# Patient Record
Sex: FEMALE | Race: WHITE | Employment: OTHER | URBAN - METROPOLITAN AREA
[De-identification: names, ages, dates, MRNs, and addresses within clinical notes are randomized per-mention and may not be internally consistent; named-entity substitution may affect disease eponyms.]

---

## 2023-04-25 ENCOUNTER — LAB REQUISITION (OUTPATIENT)
Dept: LAB | Facility: CLINIC | Age: 73
End: 2023-04-25
Payer: COMMERCIAL

## 2023-04-25 DIAGNOSIS — I50.9 HEART FAILURE, UNSPECIFIED (H): ICD-10-CM

## 2023-04-26 LAB
ANION GAP SERPL CALCULATED.3IONS-SCNC: 14 MMOL/L (ref 7–15)
BUN SERPL-MCNC: 25 MG/DL (ref 8–23)
CALCIUM SERPL-MCNC: 9.8 MG/DL (ref 8.8–10.2)
CHLORIDE SERPL-SCNC: 96 MMOL/L (ref 98–107)
CREAT SERPL-MCNC: 1.2 MG/DL (ref 0.51–0.95)
DEPRECATED HCO3 PLAS-SCNC: 28 MMOL/L (ref 22–29)
GFR SERPL CREATININE-BSD FRML MDRD: 47 ML/MIN/1.73M2
GLUCOSE SERPL-MCNC: 126 MG/DL (ref 70–99)
HGB BLD-MCNC: 12.5 G/DL (ref 11.7–15.7)
POTASSIUM SERPL-SCNC: 4.7 MMOL/L (ref 3.4–5.3)
SODIUM SERPL-SCNC: 138 MMOL/L (ref 136–145)

## 2023-04-26 PROCEDURE — 36415 COLL VENOUS BLD VENIPUNCTURE: CPT | Performed by: INTERNAL MEDICINE

## 2023-04-26 PROCEDURE — P9604 ONE-WAY ALLOW PRORATED TRIP: HCPCS | Performed by: INTERNAL MEDICINE

## 2023-04-26 PROCEDURE — 80048 BASIC METABOLIC PNL TOTAL CA: CPT | Performed by: INTERNAL MEDICINE

## 2023-04-26 PROCEDURE — 85018 HEMOGLOBIN: CPT | Performed by: INTERNAL MEDICINE

## 2023-05-01 ENCOUNTER — LAB REQUISITION (OUTPATIENT)
Dept: LAB | Facility: CLINIC | Age: 73
End: 2023-05-01
Payer: COMMERCIAL

## 2023-05-01 DIAGNOSIS — R60.1 GENERALIZED EDEMA: ICD-10-CM

## 2023-05-02 LAB
ANION GAP SERPL CALCULATED.3IONS-SCNC: 17 MMOL/L (ref 7–15)
BUN SERPL-MCNC: 27.3 MG/DL (ref 8–23)
CALCIUM SERPL-MCNC: 9.9 MG/DL (ref 8.8–10.2)
CHLORIDE SERPL-SCNC: 96 MMOL/L (ref 98–107)
CREAT SERPL-MCNC: 1.22 MG/DL (ref 0.51–0.95)
DEPRECATED HCO3 PLAS-SCNC: 25 MMOL/L (ref 22–29)
GFR SERPL CREATININE-BSD FRML MDRD: 46 ML/MIN/1.73M2
GLUCOSE SERPL-MCNC: 141 MG/DL (ref 70–99)
POTASSIUM SERPL-SCNC: 4.1 MMOL/L (ref 3.4–5.3)
SODIUM SERPL-SCNC: 138 MMOL/L (ref 136–145)

## 2023-05-02 PROCEDURE — P9604 ONE-WAY ALLOW PRORATED TRIP: HCPCS | Performed by: INTERNAL MEDICINE

## 2023-05-02 PROCEDURE — 36415 COLL VENOUS BLD VENIPUNCTURE: CPT | Performed by: INTERNAL MEDICINE

## 2023-05-02 PROCEDURE — 80048 BASIC METABOLIC PNL TOTAL CA: CPT | Performed by: INTERNAL MEDICINE

## 2023-07-10 ENCOUNTER — LAB REQUISITION (OUTPATIENT)
Dept: LAB | Facility: CLINIC | Age: 73
End: 2023-07-10
Payer: COMMERCIAL

## 2023-07-10 DIAGNOSIS — I48.20 CHRONIC ATRIAL FIBRILLATION, UNSPECIFIED (H): ICD-10-CM

## 2023-07-11 LAB
ANION GAP SERPL CALCULATED.3IONS-SCNC: 16 MMOL/L (ref 7–15)
BUN SERPL-MCNC: 23.4 MG/DL (ref 8–23)
CALCIUM SERPL-MCNC: 9.5 MG/DL (ref 8.8–10.2)
CHLORIDE SERPL-SCNC: 96 MMOL/L (ref 98–107)
CREAT SERPL-MCNC: 1.1 MG/DL (ref 0.51–0.95)
DEPRECATED HCO3 PLAS-SCNC: 25 MMOL/L (ref 22–29)
DIGOXIN SERPL-MCNC: 0.6 NG/ML (ref 0.6–2)
GFR SERPL CREATININE-BSD FRML MDRD: 52 ML/MIN/1.73M2
GLUCOSE SERPL-MCNC: 140 MG/DL (ref 70–99)
POTASSIUM SERPL-SCNC: 4.6 MMOL/L (ref 3.4–5.3)
SODIUM SERPL-SCNC: 137 MMOL/L (ref 136–145)

## 2023-07-11 PROCEDURE — P9604 ONE-WAY ALLOW PRORATED TRIP: HCPCS | Performed by: INTERNAL MEDICINE

## 2023-07-11 PROCEDURE — 80162 ASSAY OF DIGOXIN TOTAL: CPT | Performed by: INTERNAL MEDICINE

## 2023-07-11 PROCEDURE — 82310 ASSAY OF CALCIUM: CPT | Performed by: INTERNAL MEDICINE

## 2023-07-11 PROCEDURE — 36415 COLL VENOUS BLD VENIPUNCTURE: CPT | Performed by: INTERNAL MEDICINE

## 2023-09-18 ENCOUNTER — LAB REQUISITION (OUTPATIENT)
Dept: LAB | Facility: CLINIC | Age: 73
End: 2023-09-18
Payer: COMMERCIAL

## 2023-09-18 DIAGNOSIS — R30.0 DYSURIA: ICD-10-CM

## 2023-09-18 DIAGNOSIS — Z51.81 ENCOUNTER FOR THERAPEUTIC DRUG LEVEL MONITORING: ICD-10-CM

## 2023-09-19 LAB
ALBUMIN UR-MCNC: NEGATIVE MG/DL
APPEARANCE UR: ABNORMAL
BACTERIA #/AREA URNS HPF: ABNORMAL /HPF
BILIRUB UR QL STRIP: NEGATIVE
COLOR UR AUTO: YELLOW
GLUCOSE UR STRIP-MCNC: 150 MG/DL
HGB UR QL STRIP: ABNORMAL
HYALINE CASTS: 2 /LPF
KETONES UR STRIP-MCNC: NEGATIVE MG/DL
LEUKOCYTE ESTERASE UR QL STRIP: ABNORMAL
NITRATE UR QL: POSITIVE
PH UR STRIP: 6 [PH] (ref 5–7)
RBC URINE: 1 /HPF
SP GR UR STRIP: 1.01 (ref 1–1.03)
SQUAMOUS EPITHELIAL: 2 /HPF
TRANSITIONAL EPI: <1 /HPF
UROBILINOGEN UR STRIP-MCNC: NORMAL MG/DL
WBC CLUMPS #/AREA URNS HPF: PRESENT /HPF
WBC URINE: >182 /HPF

## 2023-09-19 PROCEDURE — 81001 URINALYSIS AUTO W/SCOPE: CPT | Performed by: NURSE PRACTITIONER

## 2023-09-19 PROCEDURE — 87086 URINE CULTURE/COLONY COUNT: CPT | Performed by: NURSE PRACTITIONER

## 2023-09-21 LAB
BACTERIA UR CULT: ABNORMAL
BACTERIA UR CULT: ABNORMAL

## 2023-09-24 ENCOUNTER — LAB REQUISITION (OUTPATIENT)
Dept: LAB | Facility: CLINIC | Age: 73
End: 2023-09-24
Payer: COMMERCIAL

## 2023-09-24 DIAGNOSIS — I50.9 HEART FAILURE, UNSPECIFIED (H): ICD-10-CM

## 2023-09-25 LAB
ANION GAP SERPL CALCULATED.3IONS-SCNC: 11 MMOL/L (ref 7–15)
BUN SERPL-MCNC: 43.9 MG/DL (ref 8–23)
CALCIUM SERPL-MCNC: 9.7 MG/DL (ref 8.8–10.2)
CHLORIDE SERPL-SCNC: 91 MMOL/L (ref 98–107)
CREAT SERPL-MCNC: 1.34 MG/DL (ref 0.51–0.95)
DEPRECATED HCO3 PLAS-SCNC: 30 MMOL/L (ref 22–29)
EGFRCR SERPLBLD CKD-EPI 2021: 41 ML/MIN/1.73M2
GLUCOSE SERPL-MCNC: 67 MG/DL (ref 70–99)
POTASSIUM SERPL-SCNC: 4.8 MMOL/L (ref 3.4–5.3)
SODIUM SERPL-SCNC: 132 MMOL/L (ref 136–145)

## 2023-09-25 PROCEDURE — 36415 COLL VENOUS BLD VENIPUNCTURE: CPT | Performed by: INTERNAL MEDICINE

## 2023-09-25 PROCEDURE — P9604 ONE-WAY ALLOW PRORATED TRIP: HCPCS | Performed by: INTERNAL MEDICINE

## 2023-09-25 PROCEDURE — 80048 BASIC METABOLIC PNL TOTAL CA: CPT | Performed by: INTERNAL MEDICINE

## 2023-10-10 ENCOUNTER — ASSISTED LIVING VISIT (OUTPATIENT)
Dept: GERIATRICS | Facility: CLINIC | Age: 73
End: 2023-10-10
Payer: COMMERCIAL

## 2023-10-10 VITALS
BODY MASS INDEX: 31.41 KG/M2 | DIASTOLIC BLOOD PRESSURE: 67 MMHG | RESPIRATION RATE: 16 BRPM | SYSTOLIC BLOOD PRESSURE: 115 MMHG | TEMPERATURE: 97.9 F | HEART RATE: 94 BPM | WEIGHT: 183 LBS

## 2023-10-10 DIAGNOSIS — E11.22 TYPE 2 DIABETES MELLITUS WITH STAGE 3A CHRONIC KIDNEY DISEASE, WITHOUT LONG-TERM CURRENT USE OF INSULIN (H): ICD-10-CM

## 2023-10-10 DIAGNOSIS — I50.20 HFREF (HEART FAILURE WITH REDUCED EJECTION FRACTION) (H): Primary | ICD-10-CM

## 2023-10-10 DIAGNOSIS — F01.B0 MODERATE VASCULAR DEMENTIA WITHOUT BEHAVIORAL DISTURBANCE, PSYCHOTIC DISTURBANCE, MOOD DISTURBANCE, OR ANXIETY (H): ICD-10-CM

## 2023-10-10 DIAGNOSIS — I48.20 CHRONIC ATRIAL FIBRILLATION (H): ICD-10-CM

## 2023-10-10 DIAGNOSIS — I89.0 LYMPHEDEMA: ICD-10-CM

## 2023-10-10 DIAGNOSIS — I25.10 CORONARY ARTERY DISEASE INVOLVING NATIVE CORONARY ARTERY OF NATIVE HEART WITHOUT ANGINA PECTORIS: ICD-10-CM

## 2023-10-10 DIAGNOSIS — Z71.89 ADVANCED DIRECTIVES, COUNSELING/DISCUSSION: ICD-10-CM

## 2023-10-10 DIAGNOSIS — I21.4 NSTEMI (NON-ST ELEVATED MYOCARDIAL INFARCTION) (H): ICD-10-CM

## 2023-10-10 DIAGNOSIS — I10 PRIMARY HYPERTENSION: ICD-10-CM

## 2023-10-10 DIAGNOSIS — I07.1 SEVERE TRICUSPID REGURGITATION: ICD-10-CM

## 2023-10-10 DIAGNOSIS — F41.8 DEPRESSION WITH ANXIETY: ICD-10-CM

## 2023-10-10 DIAGNOSIS — I73.9 PVD (PERIPHERAL VASCULAR DISEASE) (H): ICD-10-CM

## 2023-10-10 DIAGNOSIS — N18.31 TYPE 2 DIABETES MELLITUS WITH STAGE 3A CHRONIC KIDNEY DISEASE, WITHOUT LONG-TERM CURRENT USE OF INSULIN (H): ICD-10-CM

## 2023-10-10 DIAGNOSIS — M06.00 RHEUMATOID ARTHRITIS WITH NEGATIVE RHEUMATOID FACTOR, INVOLVING UNSPECIFIED SITE (H): ICD-10-CM

## 2023-10-10 PROCEDURE — 99344 HOME/RES VST NEW MOD MDM 60: CPT | Performed by: NURSE PRACTITIONER

## 2023-10-10 RX ORDER — CHOLECALCIFEROL (VITAMIN D3) 50 MCG
1 TABLET ORAL DAILY
COMMUNITY
End: 2023-10-11

## 2023-10-10 RX ORDER — METOPROLOL SUCCINATE 25 MG/1
25 TABLET, EXTENDED RELEASE ORAL DAILY
COMMUNITY
End: 2023-10-11

## 2023-10-10 RX ORDER — MULTIVITAMIN,THERAPEUTIC
1 TABLET ORAL DAILY
COMMUNITY
End: 2023-10-11

## 2023-10-10 RX ORDER — SPIRONOLACTONE 25 MG/1
12.5 TABLET ORAL DAILY
COMMUNITY
End: 2023-10-11

## 2023-10-10 RX ORDER — POTASSIUM CHLORIDE 1500 MG/1
20 TABLET, EXTENDED RELEASE ORAL 2 TIMES DAILY
COMMUNITY
End: 2023-10-11

## 2023-10-10 RX ORDER — DIGOXIN 125 MCG
125 TABLET ORAL DAILY
COMMUNITY
End: 2023-10-11

## 2023-10-10 RX ORDER — SERTRALINE HYDROCHLORIDE 25 MG/1
25 TABLET, FILM COATED ORAL DAILY
COMMUNITY
End: 2023-10-11

## 2023-10-10 RX ORDER — NYSTATIN 100000 U/G
OINTMENT TOPICAL PRN
COMMUNITY
End: 2023-10-10

## 2023-10-10 RX ORDER — LOSARTAN POTASSIUM 25 MG/1
12.5 TABLET ORAL DAILY
COMMUNITY
End: 2023-10-11

## 2023-10-10 RX ORDER — LANOLIN ALCOHOL/MO/W.PET/CERES
400 CREAM (GRAM) TOPICAL 2 TIMES DAILY
COMMUNITY
End: 2023-10-11

## 2023-10-10 RX ORDER — TORSEMIDE 20 MG/1
40 TABLET ORAL 2 TIMES DAILY
COMMUNITY
End: 2023-10-11

## 2023-10-10 RX ORDER — NITROGLYCERIN 0.4 MG/1
0.4 TABLET SUBLINGUAL EVERY 5 MIN PRN
COMMUNITY
End: 2024-09-04

## 2023-10-10 RX ORDER — ROSUVASTATIN CALCIUM 10 MG/1
10 TABLET, COATED ORAL DAILY
COMMUNITY
End: 2023-10-11

## 2023-10-10 NOTE — PROGRESS NOTES
"[facility] GERIATRICS    PRIMARY CARE PROVIDER AND CLINIC:  SHALONAD Kincaid CNP, [address] [facility] [address]  Chief Complaint   Patient presents with    [facility] Medical Record Number:  [id number]  Place of Service where encounter took place:  THE [facility]) [[id number]]    Rachel Weiss  is a 73 year old  (1950), admitted to Memory Care at the above facility 9/26/2023 for a higher level of care following hospitalization and tcu stay.     HPI:    Medical history significant for vascular dementia, HFrEF, afib, CAD with STEMI and stent to LAD 11/2021, ischemic cardiomyopathy, PVD, lymphedema, diabetes type 2, CKD stage 3, HTN, asthma, rheumatoid arthritis, depression, anxiety.   She was hospitalized at [facility] 9/6-9/13/2023 with acute on chronic CHF. She was found to be in afib with RVR. Cardiology consulted. She was diuresed with lasix IV and discharged on higher dose of torsemide (increased from 60 mg daily to 60 mg in am and 40 mg in pm). Empagliflozin was started. Digoxin dose was increased and metoprolol continued for afib. She was diuresed for 14 lbs with weight 181 lbs at discharge. Estimated dry weight ~182 lbs. She discharged to [facility] [place], operated by [facility] where she made fair progress in therapies. Torsemide was decreased to 40 mg bid and losartan decreased to 12.5 mg daily for hypotension and weight 177 lbs. She received cipro for UTI.     She is a poor historian. Pleasantly confused. Denies feeling ill. Good appetite. Denies pain. Reports her legs are \"about the same.\" Wheelchair bound and requires assist of 1 with transfers and cares. Staff reports no behavior issues. She had a wound of her RLE that has healed. Followed by [facility].   Spoke with her  Madhu. He's been caring for her at home for 2 yrs. This was her 3rd hospitalization this year and her care needs have increased. He reports her LE edema has improved and she seems to " be adjusting well to the unit.   [facility]     CODE STATUS/ADVANCE DIRECTIVES DISCUSSION:  No CPR- Do NOT Intubate  DNR/DNI  ALLERGIES:   Allergies   Allergen Reactions    Shellfish-Derived Products Anaphylaxis     Shellfish.     Amoxicillin     Bactrim [Sulfamethoxazole-Trimethoprim]     Ceftin [Cefuroxime]     Celebrex [Celecoxib]     Doxycycline     Levaquin [Levofloxacin]     Lodine [Etodolac]     Pcn [Penicillins] Hives    Sulfa Antibiotics     Valtrex [Valacyclovir]     Zithromax [Azithromycin]       PAST MEDICAL HISTORY:   Past Medical History:   Diagnosis Date    A-fib (H)     Arthritis     CAD (coronary artery disease)     CKD (chronic kidney disease) stage 3, GFR 30-59 ml/min (H)     Depression with anxiety     Diabetes mellitus, type 2 (H)     Diverticulitis     perforation 10/2013    HFrEF (heart failure with reduced ejection fraction) (H)     HTN (hypertension)     Ischemic cardiomyopathy     Lymphedema     NSTEMI (non-ST elevated myocardial infarction) (H) 11/2021    stent to LAD    PVD (peripheral vascular disease) (H24)     RA (rheumatoid arthritis) (H)     Severe tricuspid regurgitation     Shingles 2006    Umbilical hernia without obstruction and without gangrene     Uncomplicated asthma     Vascular dementia (H)       PAST SURGICAL HISTORY:   has a past surgical history that includes tonsillectomy; Cataract Extraction (Bilateral); Stent; and Cholecystectomy (04/2023).  FAMILY HISTORY: family history includes Cancer in her brother; Hypertension in her brother and mother; Throat cancer in her father.  SOCIAL HISTORY:   reports that she has never smoked. She has never used smokeless tobacco. She reports that she does not currently use alcohol. She reports that she does not use drugs.  Patient's living condition: lives in an assisted living facility  Previously lived at home with her , Madhu. No children. 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    Hinduism: no blood products     Post Discharge Medication Reconciliation Status:   MED REC REQUIRED  Post Medication Reconciliation Status:  Discharge medications reconciled, continue medications without change       Current Outpatient Medications   Medication Sig    apixaban ANTICOAGULANT (ELIQUIS) 5 MG tablet Take 5 mg by mouth 2 times daily    digoxin (LANOXIN) 125 MCG tablet Take 1 tablet (125 mcg) by mouth daily    dimethicone-zinc oxide (ANSLEY PROTECT) external cream Apply topically daily    dulaglutide (TRULICITY) 0.75 MG/0.5ML pen Inject 0.75 mg Subcutaneous every 7 days    empagliflozin (JARDIANCE) 10 MG TABS tablet Take 1 tablet (10 mg) by mouth daily    folic acid (FOLVITE) 400 MCG tablet Take 1 tablet (400 mcg) by mouth 2 times daily    losartan (COZAAR) 25 MG tablet Take 0.5 tablets (12.5 mg) by mouth daily    metFORMIN (GLUCOPHAGE) 500 MG tablet Take 2 tablets (1,000 mg) by mouth 2 times daily (with meals)    metoprolol succinate ER (TOPROL XL) 25 MG 24 hr tablet Take 1 tablet (25 mg) by mouth daily    multivitamin, therapeutic (THERA-VIT) TABS tablet Take 1 tablet by mouth daily    nitroGLYcerin (NITROSTAT) 0.4 MG sublingual tablet Place 0.4 mg under the tongue every 5 minutes as needed for chest pain For chest pain place 1 tablet under the tongue every 5 minutes for 3 doses. If symptoms persist 5 minutes after 1st dose call 911.    potassium chloride ER (K-TAB) 20 MEQ CR tablet Take 1 tablet (20 mEq) by mouth 2 times daily    rosuvastatin (CRESTOR) 10 MG tablet Take 1 tablet (10 mg) by mouth daily    sertraline (ZOLOFT) 25 MG tablet Take 1 tablet (25 mg) by mouth daily    spironolactone (ALDACTONE) 25 MG tablet Take 0.5 tablets (12.5 mg) by mouth daily    torsemide (DEMADEX) 20 MG tablet Take 2 tablets (40 mg) by mouth 2 times daily    vitamin D3 (CHOLECALCIFEROL) 50 mcg (2000 units) tablet Take 1 tablet (50 mcg) by mouth daily     No current facility-administered medications for this  visit.     ROS:  Limited due to dementia. Positives as noted under HPI     Vitals:  /67   Pulse 94   Temp 97.9  F (36.6  C)   Resp 16   Wt 83 kg (183 lb)   BMI 31.41 kg/m    Exam:  GENERAL APPEARANCE:  Alert, in no distress  ENT:  [place], oropharynx clear  EYES:  conjunctiva and lids normal  NECK:  no adenopathy, no thyromegaly  RESP:  lungs clear to auscultation   CV:  regular rate and rhythm, 1/6 murmur, peripheral edema 2+ in both LE  ABDOMEN:  soft, non-tender, no distension, no masses  M/S:   wheelchair. IDCKERSON with good strength. No joint inflammation  SKIN:  no visible rashes or open areas  PSYCH:  oriented to self, situation, insight and judgement impaired, memory impaired , affect and mood normal    Lab/Diagnostic data:  Recent labs in [facility] reviewed by me today.     ECHO 6/28/2023:  Final Impressions:   Limited Echocardiogram performed    1. Technically limited exam.    2. Normal LV size, normal wall thickness, estimated EF of ~30%.    3. Mid and distal segments are severely hypo- or akinetic.    4. Severe RV enlargement, mild or moderately reduced systolic function.    5. Severe biatrial enlargement.    6. The aortic valve is sclerotic, mild stenosis and no regurgitation.    7. Severe MAC, sclerotic mitral leaflets, trace mitral regurgitation.    8. Moderate-severe tricuspid regurgitation.    9. Pulmonary hypertension: PASP 63 mmHg + RA pressure.   10. The inferior vena cava is dilated, respiratory size variation less than 50%.       ASSESSMENT / PLAN:  (I50.20) HFrEF (heart failure with reduced ejection fraction) (H)  (primary encounter diagnosis)  Comment: ECHO above-EF 30%, severe TR.   Appears euvolemic with weight 185 lbs   Plan: continue torsemide 40 mg bid, digoxin, metoprolol, losartan. Follow weight, symptoms. CBC, BMP. Follow up with [facility] per usual schedule.   agrees with plan of care  Discussed with staff     (I48.20) Chronic atrial fibrillation (H)  Comment: rate  controlled, rhythm regular on exam   History of left atrial appendage thrombus, resolved   Plan: continue apixaban 5 mg bid,  digoxin 125 mcg daily, metoprolol ER 25 mg daily    (I25.10) Coronary artery disease involving native coronary artery of native heart without angina pectoris  Comment: history of NSTEMI, stent to LAD 11/2021. Last angio 11/2022 showed non-obstructive disease, patent stent. No chest pain or acute issues   Plan: continue     (F01.B0) Moderate vascular dementia without behavioral disturbance, psychotic disturbance, mood disturbance, or anxiety (H)  Comment: moderate deficits with poor short term memory and low functional status. Appears to be adjusting well to the unit   Plan: Memory Care staff assistance with cares, meals, activities, med admin    (E11.22,  N18.31) Type 2 diabetes mellitus with stage 3a chronic kidney disease, without long-term current use of insulin (H)  Comment: empagliflozin started while inpatient for CHF. Last HgbA1c found on chart review was 84 on 3/8/2021.   Blood glucose: 157-197, outlier 76  Plan: HgbA1c. Continue empagliflozin 10 mg daily, metformin 1000 mg bid, Trulicity 0.75 mg weekly. Monitor blood glucose     (I73.9) PVD (peripheral vascular disease) (H24)  (I89.0) Lymphedema  Comment: chronic, improved  Plan: continue torsemide. Closely monitor skin. Continue PHYSICAL THERAPY/OT/lymphedema management with [facility]     (I10) Primary hypertension  Comment: recent BP soft: 115/67  Losartan and torsemide decreased while on tcu due to hypotension   Plan: continue losartan 12.5 mg daily, torsemide 40 mg bid, metoprolol ER 25 mg daily, spironolactone 12.5 mg daily. Monitor VS and adjust meds as indicated.     (F41.8) Depression with anxiety  Comment: appears well managed   Plan: continue sertraline 25 mg daily    (M06.00) Rheumatoid arthritis with negative rheumatoid factor, involving unspecified site (H)  Comment: per history, no acute pain issues. Has been on  low dose prednisone and methotrexate in the past. Last saw Dr Singh 5/2021.   Plan: monitor symptoms     (Z71.89) Advanced directives, counseling/discussion  Comment:  confirms DNR/DNI  Plan: POLST completed and orders updated         Electronically signed by:  SHALONDA Kincaid CNP

## 2023-10-10 NOTE — LETTER
"    10/10/2023        RE: Rachel Weiss  [address] [facility] [address]        [facility] GERIATRICS    PRIMARY CARE PROVIDER AND CLINIC:  SHALONDA Kincaid CNP, [address] [facility] [address]  Chief Complaint   Patient presents with     [facility] Medical Record Number:  [id number]  Place of Service where encounter took place:  THE [facility]) [[id number]]    Rachel Weiss  is a 73 year old  (1950), admitted to Memory Care at the above facility 9/26/2023 for a higher level of care following hospitalization and tcu stay.     HPI:    Medical history significant for vascular dementia, HFrEF, afib, CAD with STEMI and stent to LAD 11/2021, ischemic cardiomyopathy, PVD, lymphedema, diabetes type 2, CKD stage 3, HTN, asthma, rheumatoid arthritis, depression, anxiety.   She was hospitalized at [facility] 9/6-9/13/2023 with acute on chronic CHF. She was found to be in afib with RVR. Cardiology consulted. She was diuresed with lasix IV and discharged on higher dose of torsemide (increased from 60 mg daily to 60 mg in am and 40 mg in pm). Empagliflozin was started. Digoxin dose was increased and metoprolol continued for afib. She was diuresed for 14 lbs with weight 181 lbs at discharge. Estimated dry weight ~182 lbs. She discharged to [facility] where she made fair progress in therapies. Torsemide was decreased to 40 mg bid and losartan decreased to 12.5 mg daily for hypotension and weight 177 lbs. She received cipro for UTI.     She is a poor historian. Pleasantly confused. Denies feeling ill. Good appetite. Denies pain. Reports her legs are \"about the same.\" Wheelchair bound and requires assist of 1 with transfers and cares. Staff reports no behavior issues. She had a wound of her RLE that has healed. Followed by [facility].   Spoke with her  Madhu. He's been caring for her at home for 2 yrs. This was her 3rd hospitalization this year " and her care needs have increased. He reports her LE edema has improved and she seems to be adjusting well to the unit.   [facility] 17/30 in 2021.     CODE STATUS/ADVANCE DIRECTIVES DISCUSSION:  No CPR- Do NOT Intubate  DNR/DNI  ALLERGIES:   Allergies   Allergen Reactions     Shellfish-Derived Products Anaphylaxis     Shellfish.      Amoxicillin      Bactrim [Sulfamethoxazole-Trimethoprim]      Ceftin [Cefuroxime]      Celebrex [Celecoxib]      Doxycycline      Levaquin [Levofloxacin]      Lodine [Etodolac]      Pcn [Penicillins] Hives     Sulfa Antibiotics      Valtrex [Valacyclovir]      Zithromax [Azithromycin]       PAST MEDICAL HISTORY:   Past Medical History:   Diagnosis Date     A-fib (H)      Arthritis      CAD (coronary artery disease)      CKD (chronic kidney disease) stage 3, GFR 30-59 ml/min (H)      Depression with anxiety      Diabetes mellitus, type 2 (H)      Diverticulitis     perforation 10/2013     HFrEF (heart failure with reduced ejection fraction) (H)      HTN (hypertension)      Ischemic cardiomyopathy      Lymphedema      NSTEMI (non-ST elevated myocardial infarction) (H) 11/2021    stent to LAD     PVD (peripheral vascular disease) (H24)      RA (rheumatoid arthritis) (H)      Severe tricuspid regurgitation      Shingles 2006     Umbilical hernia without obstruction and without gangrene      Uncomplicated asthma      Vascular dementia (H)       PAST SURGICAL HISTORY:   has a past surgical history that includes tonsillectomy; Cataract Extraction (Bilateral); Stent; and Cholecystectomy (04/2023).  FAMILY HISTORY: family history includes Cancer in her brother; Hypertension in her brother and mother; Throat cancer in her father.  SOCIAL HISTORY:   reports that she has never smoked. She has never used smokeless tobacco. She reports that she does not currently use alcohol. She reports that she does not use drugs.  Patient's living condition: lives in an assisted living facility  Previously lived at  home with her , Madhu. No children. 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   Pentecostal: no blood products     Post Discharge Medication Reconciliation Status:   MED REC REQUIRED  Post Medication Reconciliation Status:  Discharge medications reconciled, continue medications without change       Current Outpatient Medications   Medication Sig     apixaban ANTICOAGULANT (ELIQUIS) 5 MG tablet Take 5 mg by mouth 2 times daily     digoxin (LANOXIN) 125 MCG tablet Take 1 tablet (125 mcg) by mouth daily     dimethicone-zinc oxide (ANSLEY PROTECT) external cream Apply topically daily     dulaglutide (TRULICITY) 0.75 MG/0.5ML pen Inject 0.75 mg Subcutaneous every 7 days     empagliflozin (JARDIANCE) 10 MG TABS tablet Take 1 tablet (10 mg) by mouth daily     folic acid (FOLVITE) 400 MCG tablet Take 1 tablet (400 mcg) by mouth 2 times daily     losartan (COZAAR) 25 MG tablet Take 0.5 tablets (12.5 mg) by mouth daily     metFORMIN (GLUCOPHAGE) 500 MG tablet Take 2 tablets (1,000 mg) by mouth 2 times daily (with meals)     metoprolol succinate ER (TOPROL XL) 25 MG 24 hr tablet Take 1 tablet (25 mg) by mouth daily     multivitamin, therapeutic (THERA-VIT) TABS tablet Take 1 tablet by mouth daily     nitroGLYcerin (NITROSTAT) 0.4 MG sublingual tablet Place 0.4 mg under the tongue every 5 minutes as needed for chest pain For chest pain place 1 tablet under the tongue every 5 minutes for 3 doses. If symptoms persist 5 minutes after 1st dose call 911.     potassium chloride ER (K-TAB) 20 MEQ CR tablet Take 1 tablet (20 mEq) by mouth 2 times daily     rosuvastatin (CRESTOR) 10 MG tablet Take 1 tablet (10 mg) by mouth daily     sertraline (ZOLOFT) 25 MG tablet Take 1 tablet (25 mg) by mouth daily     spironolactone (ALDACTONE) 25 MG tablet Take 0.5 tablets (12.5 mg) by mouth daily     torsemide (DEMADEX) 20 MG tablet Take 2 tablets (40 mg) by mouth 2 times daily     vitamin D3 (CHOLECALCIFEROL)  50 mcg (2000 units) tablet Take 1 tablet (50 mcg) by mouth daily     No current facility-administered medications for this visit.     ROS:  Limited due to dementia. Positives as noted under HPI     Vitals:  /67   Pulse 94   Temp 97.9  F (36.6  C)   Resp 16   Wt 83 kg (183 lb)   BMI 31.41 kg/m    Exam:  GENERAL APPEARANCE:  Alert, in no distress  ENT:  [place], oropharynx clear  EYES:  conjunctiva and lids normal  NECK:  no adenopathy, no thyromegaly  RESP:  lungs clear to auscultation   CV:  regular rate and rhythm, 1/6 murmur, peripheral edema 2+ in both LE  ABDOMEN:  soft, non-tender, no distension, no masses  M/S:   wheelchair. DICKERSON with good strength. No joint inflammation  SKIN:  no visible rashes or open areas  PSYCH:  oriented to self, situation, insight and judgement impaired, memory impaired , affect and mood normal    Lab/Diagnostic data:  Recent labs in [facility] reviewed by me today.     ECHO 6/28/2023:  Final Impressions:   Limited Echocardiogram performed    1. Technically limited exam.    2. Normal LV size, normal wall thickness, estimated EF of ~30%.    3. Mid and distal segments are severely hypo- or akinetic.    4. Severe RV enlargement, mild or moderately reduced systolic function.    5. Severe biatrial enlargement.    6. The aortic valve is sclerotic, mild stenosis and no regurgitation.    7. Severe MAC, sclerotic mitral leaflets, trace mitral regurgitation.    8. Moderate-severe tricuspid regurgitation.    9. Pulmonary hypertension: PASP 63 mmHg + RA pressure.   10. The inferior vena cava is dilated, respiratory size variation less than 50%.       ASSESSMENT / PLAN:  (I50.20) HFrEF (heart failure with reduced ejection fraction) (H)  (primary encounter diagnosis)  Comment: ECHO above-EF 30%, severe TR.   Appears euvolemic with weight 185 lbs   Plan: continue torsemide 40 mg bid, digoxin, metoprolol, losartan. Follow weight, symptoms. CBC, BMP. Follow up with [facility] per usual  schedule.   agrees with plan of care  Discussed with staff     (I48.20) Chronic atrial fibrillation (H)  Comment: rate controlled, rhythm regular on exam   History of left atrial appendage thrombus, resolved   Plan: continue apixaban 5 mg bid,  digoxin 125 mcg daily, metoprolol ER 25 mg daily    (I25.10) Coronary artery disease involving native coronary artery of native heart without angina pectoris  Comment: history of NSTEMI, stent to LAD 11/2021. Last angio 11/2022 showed non-obstructive disease, patent stent. No chest pain or acute issues   Plan: continue     (F01.B0) Moderate vascular dementia without behavioral disturbance, psychotic disturbance, mood disturbance, or anxiety (H)  Comment: moderate deficits with poor short term memory and low functional status. Appears to be adjusting well to the unit   Plan: Memory Care staff assistance with cares, meals, activities, med admin    (E11.22,  N18.31) Type 2 diabetes mellitus with stage 3a chronic kidney disease, without long-term current use of insulin (H)  Comment: empagliflozin started while inpatient for CHF. Last HgbA1c found on chart review was 84 on 3/8/2021.   Blood glucose: 157-197, outlier 76  Plan: HgbA1c. Continue empagliflozin 10 mg daily, metformin 1000 mg bid, Trulicity 0.75 mg weekly. Monitor blood glucose     (I73.9) PVD (peripheral vascular disease) (H24)  (I89.0) Lymphedema  Comment: chronic, improved  Plan: continue torsemide. Closely monitor skin. Continue PHYSICAL THERAPY/OT/lymphedema management with [facility]     (I10) Primary hypertension  Comment: recent BP soft: 115/67  Losartan and torsemide decreased while on tcu due to hypotension   Plan: continue losartan 12.5 mg daily, torsemide 40 mg bid, metoprolol ER 25 mg daily, spironolactone 12.5 mg daily. Monitor VS and adjust meds as indicated.     (F41.8) Depression with anxiety  Comment: appears well managed   Plan: continue sertraline 25 mg daily    (M06.00) Rheumatoid  arthritis with negative rheumatoid factor, involving unspecified site (H)  Comment: per history, no acute pain issues. Has been on low dose prednisone and methotrexate in the past. Last saw Dr Singh 5/2021.   Plan: monitor symptoms     (Z71.89) Advanced directives, counseling/discussion  Comment:  confirms DNR/DNI  Plan: POLST completed and orders updated         Electronically signed by:  SHALONDA Kincaid CNP                     Sincerely,        SHALONDA Kincaid CNP

## 2023-10-11 DIAGNOSIS — I50.9 CONGESTIVE HEART FAILURE, UNSPECIFIED HF CHRONICITY, UNSPECIFIED HEART FAILURE TYPE (H): Primary | ICD-10-CM

## 2023-10-11 RX ORDER — CHOLECALCIFEROL (VITAMIN D3) 50 MCG
1 TABLET ORAL DAILY
Qty: 90 TABLET | Refills: 3 | Status: SHIPPED | OUTPATIENT
Start: 2023-10-11 | End: 2024-09-03

## 2023-10-11 RX ORDER — DIGOXIN 125 MCG
125 TABLET ORAL DAILY
Qty: 90 TABLET | Refills: 3 | Status: SHIPPED | OUTPATIENT
Start: 2023-10-11 | End: 2024-09-03

## 2023-10-11 RX ORDER — POTASSIUM CHLORIDE 1500 MG/1
20 TABLET, EXTENDED RELEASE ORAL 2 TIMES DAILY
Qty: 180 TABLET | Refills: 3 | Status: SHIPPED | OUTPATIENT
Start: 2023-10-11 | End: 2024-02-23 | Stop reason: DRUGHIGH

## 2023-10-11 RX ORDER — SPIRONOLACTONE 25 MG/1
12.5 TABLET ORAL DAILY
Qty: 45 TABLET | Refills: 3 | Status: SHIPPED | OUTPATIENT
Start: 2023-10-11 | End: 2024-09-03

## 2023-10-11 RX ORDER — LOSARTAN POTASSIUM 25 MG/1
12.5 TABLET ORAL DAILY
Qty: 45 TABLET | Refills: 3 | Status: SHIPPED | OUTPATIENT
Start: 2023-10-11 | End: 2023-12-15

## 2023-10-11 RX ORDER — METOPROLOL SUCCINATE 25 MG/1
25 TABLET, EXTENDED RELEASE ORAL DAILY
Qty: 90 TABLET | Refills: 3 | Status: SHIPPED | OUTPATIENT
Start: 2023-10-11 | End: 2024-09-03

## 2023-10-11 RX ORDER — SERTRALINE HYDROCHLORIDE 25 MG/1
25 TABLET, FILM COATED ORAL DAILY
Qty: 90 TABLET | Refills: 3 | Status: SHIPPED | OUTPATIENT
Start: 2023-10-11 | End: 2023-11-21 | Stop reason: DRUGHIGH

## 2023-10-11 RX ORDER — TORSEMIDE 20 MG/1
40 TABLET ORAL 2 TIMES DAILY
Qty: 360 TABLET | Refills: 3 | Status: SHIPPED | OUTPATIENT
Start: 2023-10-11 | End: 2024-02-28

## 2023-10-11 RX ORDER — MULTIVITAMIN,THERAPEUTIC
1 TABLET ORAL DAILY
Qty: 90 TABLET | Refills: 3 | Status: SHIPPED | OUTPATIENT
Start: 2023-10-11 | End: 2024-05-14

## 2023-10-11 RX ORDER — ROSUVASTATIN CALCIUM 10 MG/1
10 TABLET, COATED ORAL DAILY
Qty: 90 TABLET | Refills: 3 | Status: SHIPPED | OUTPATIENT
Start: 2023-10-11 | End: 2024-09-03

## 2023-10-11 RX ORDER — LANOLIN ALCOHOL/MO/W.PET/CERES
400 CREAM (GRAM) TOPICAL 2 TIMES DAILY
Qty: 180 TABLET | Refills: 3 | Status: SHIPPED | OUTPATIENT
Start: 2023-10-11 | End: 2024-05-14

## 2023-10-13 ENCOUNTER — TELEPHONE (OUTPATIENT)
Dept: GERIATRICS | Facility: CLINIC | Age: 73
End: 2023-10-13
Payer: COMMERCIAL

## 2023-10-18 ENCOUNTER — LAB REQUISITION (OUTPATIENT)
Dept: LAB | Facility: CLINIC | Age: 73
End: 2023-10-18
Payer: COMMERCIAL

## 2023-10-18 DIAGNOSIS — E11.69 TYPE 2 DIABETES MELLITUS WITH OTHER SPECIFIED COMPLICATION (H): ICD-10-CM

## 2023-10-18 DIAGNOSIS — I50.40 UNSPECIFIED COMBINED SYSTOLIC (CONGESTIVE) AND DIASTOLIC (CONGESTIVE) HEART FAILURE (H): ICD-10-CM

## 2023-10-19 ENCOUNTER — ASSISTED LIVING VISIT (OUTPATIENT)
Dept: GERIATRICS | Facility: CLINIC | Age: 73
End: 2023-10-19
Payer: COMMERCIAL

## 2023-10-19 VITALS
TEMPERATURE: 98.1 F | HEART RATE: 61 BPM | BODY MASS INDEX: 31.82 KG/M2 | RESPIRATION RATE: 14 BRPM | SYSTOLIC BLOOD PRESSURE: 110 MMHG | WEIGHT: 185.4 LBS | DIASTOLIC BLOOD PRESSURE: 53 MMHG

## 2023-10-19 DIAGNOSIS — I89.0 LYMPHEDEMA: ICD-10-CM

## 2023-10-19 DIAGNOSIS — I50.20 HFREF (HEART FAILURE WITH REDUCED EJECTION FRACTION) (H): ICD-10-CM

## 2023-10-19 DIAGNOSIS — F01.B0 MODERATE VASCULAR DEMENTIA WITHOUT BEHAVIORAL DISTURBANCE, PSYCHOTIC DISTURBANCE, MOOD DISTURBANCE, OR ANXIETY (H): ICD-10-CM

## 2023-10-19 DIAGNOSIS — L03.115 CELLULITIS OF RIGHT LOWER EXTREMITY: Primary | ICD-10-CM

## 2023-10-19 LAB
ANION GAP SERPL CALCULATED.3IONS-SCNC: 15 MMOL/L (ref 7–15)
BUN SERPL-MCNC: 31.8 MG/DL (ref 8–23)
CALCIUM SERPL-MCNC: 9.7 MG/DL (ref 8.8–10.2)
CHLORIDE SERPL-SCNC: 92 MMOL/L (ref 98–107)
CREAT SERPL-MCNC: 1.15 MG/DL (ref 0.51–0.95)
DEPRECATED HCO3 PLAS-SCNC: 28 MMOL/L (ref 22–29)
EGFRCR SERPLBLD CKD-EPI 2021: 49 ML/MIN/1.73M2
ERYTHROCYTE [DISTWIDTH] IN BLOOD BY AUTOMATED COUNT: 19.4 % (ref 10–15)
GLUCOSE SERPL-MCNC: 101 MG/DL (ref 70–99)
HBA1C MFR BLD: 6.7 %
HCT VFR BLD AUTO: 37.8 % (ref 35–47)
HGB BLD-MCNC: 12 G/DL (ref 11.7–15.7)
MCH RBC QN AUTO: 28.2 PG (ref 26.5–33)
MCHC RBC AUTO-ENTMCNC: 31.7 G/DL (ref 31.5–36.5)
MCV RBC AUTO: 89 FL (ref 78–100)
PLATELET # BLD AUTO: 217 10E3/UL (ref 150–450)
POTASSIUM SERPL-SCNC: 4.1 MMOL/L (ref 3.4–5.3)
RBC # BLD AUTO: 4.26 10E6/UL (ref 3.8–5.2)
SODIUM SERPL-SCNC: 135 MMOL/L (ref 135–145)
WBC # BLD AUTO: 7.3 10E3/UL (ref 4–11)

## 2023-10-19 PROCEDURE — 99349 HOME/RES VST EST MOD MDM 40: CPT | Performed by: NURSE PRACTITIONER

## 2023-10-19 PROCEDURE — 85027 COMPLETE CBC AUTOMATED: CPT | Mod: ORL | Performed by: NURSE PRACTITIONER

## 2023-10-19 PROCEDURE — P9603 ONE-WAY ALLOW PRORATED MILES: HCPCS | Mod: ORL | Performed by: NURSE PRACTITIONER

## 2023-10-19 PROCEDURE — 36415 COLL VENOUS BLD VENIPUNCTURE: CPT | Mod: ORL | Performed by: NURSE PRACTITIONER

## 2023-10-19 PROCEDURE — 83036 HEMOGLOBIN GLYCOSYLATED A1C: CPT | Mod: ORL | Performed by: NURSE PRACTITIONER

## 2023-10-19 PROCEDURE — 80048 BASIC METABOLIC PNL TOTAL CA: CPT | Mod: ORL | Performed by: NURSE PRACTITIONER

## 2023-10-19 RX ORDER — CIPROFLOXACIN 500 MG/1
500 TABLET, FILM COATED ORAL 2 TIMES DAILY
Qty: 14 TABLET | Refills: 0 | Status: SHIPPED | OUTPATIENT
Start: 2023-10-19 | End: 2023-10-26

## 2023-10-19 NOTE — LETTER
10/19/2023        RE: Rachel Weiss  [facility]   [address] [facility] [id number]        [facility] GERIATRICS    Chief Complaint   Patient presents with     RECHECK     HPI:  Rachel Weiss is a 73 year old  (1950), who is being seen today for an episodic care visit at: THE [facility] ([facility]) [[id number]].   She admitted to Memory Care at this facility 9/26/2023 for a higher level of care following hospitalization and tcu stay.   Medical history significant for vascular dementia, HFrEF, afib, CAD with STEMI and stent to LAD 11/2021, ischemic cardiomyopathy, PVD, lymphedema, diabetes type 2, CKD stage 3, HTN, asthma, rheumatoid arthritis, depression, anxiety.   She was hospitalized at [facility] 9/6-9/13/2023 with acute on chronic CHF. She was found to be in afib with RVR. Cardiology consulted. She was diuresed with lasix IV and discharged on higher dose of torsemide (increased from 60 mg daily to 60 mg in am and 40 mg in pm). Empagliflozin was started. Digoxin dose was increased and metoprolol continued for afib. She was diuresed for 14 lbs with weight 181 lbs at discharge. Estimated dry weight ~182 lbs. She discharged to [facility] where she made fair progress in therapies. Torsemide was decreased to 40 mg bid and losartan decreased to 12.5 mg daily for hypotension and weight 177 lbs. She received cipro for UTI.     Today's concern is:      Cellulitis of right lower extremity  Lymphedema  HFrEF (heart failure with reduced ejection fraction) (H)  Moderate vascular dementia without behavioral disturbance, psychotic disturbance, mood disturbance, or anxiety (H)  She is seen today after staff reported redness, warmth and weeping of her RLE for the past 1-2 days. Afebrile. She is a poor historian. Denies pain of her legs. Denies feeling ill. Wheelchair bound and requires assist of 1 with transfers and cares. Ambulating up to 100 ft with walker and assist during therapy  sessions.    Allergies, and PMH/PSH reviewed in EPIC today    REVIEW OF SYSTEMS:  Unable to obtain due to dementia. Positives as noted under HPI.       Objective:   /53   Pulse 61   Temp 98.1  F (36.7  C)   Resp 14   Wt 84.1 kg (185 lb 6.4 oz)   BMI 31.82 kg/m    GENERAL APPEARANCE:  Alert, in no distress  ENT:  [place], oropharynx clear  EYES:  conjunctiva and lids normal  NECK:  no adenopathy, no thyromegaly  RESP:  lungs clear to auscultation   CV:  regular rate and rhythm, 1/6 murmur, peripheral edema 2-3+ in both LE  ABDOMEN:  soft, non-tender, no distension, no masses  M/S:   wheelchair. DICKERSON with good strength. No joint inflammation  SKIN:  erythema and warmth of right ankle and lower leg,with scant weeping around the inner ankle, no open wound. Stasis changes of LLE, no weeping or open areas  PSYCH:  oriented to self, situation, insight and judgement impaired, memory impaired , affect and mood normal    [facility] reviewed by me today.     ASSESSMENT / PLAN:  (L03.115) Cellulitis of right lower extremity  (primary encounter diagnosis)  (I89.0) Lymphedema  Comment: cellulitis with mild weeping of right ankle and lower leg. She does not appear acutely ill.   Treatment options limited due to multiple medication allergies/intolerances. Discussed with her ,who confirms she has tolerated cipro in the past  Plan: cipro 500 mg bid X 7 days. Wound care using a foam dressing-keep clean and dry. Continue lymphedema management with [facility]     (I50.20) HFrEF (heart failure with reduced ejection fraction) (H)  Comment: weight is up 4 lbs since admission. BPs remain soft  Plan: continue current meds, including torsemide 40 mg bid. Follow weight, symptoms, BP. Staff assistance with transfers and mobility     (F01.B0) Moderate vascular dementia without behavioral disturbance, psychotic disturbance, mood disturbance, or anxiety (H)  Comment: appears to be adjusting well to the unit, pleasant  and cooperative   Plan: Memory Care staff assistance with cares, meals, activities, med admin    :    Electronically signed by: SHALONDA Kincaid CNP           Sincerely,        SHALONDA Kincaid CNP

## 2023-10-19 NOTE — PROGRESS NOTES
[facility] GERIATRICS    Chief Complaint   Patient presents with    RECHECK     HPI:  Rachel Weiss is a 73 year old  (1950), who is being seen today for an episodic care visit at: [facility]) [[id number]].   She admitted to Memory Care at this facility 9/26/2023 for a higher level of care following hospitalization and tcu stay.   Medical history significant for vascular dementia, HFrEF, afib, CAD with STEMI and stent to LAD 11/2021, ischemic cardiomyopathy, PVD, lymphedema, diabetes type 2, CKD stage 3, HTN, asthma, rheumatoid arthritis, depression, anxiety.   She was hospitalized at [facility] 9/6-9/13/2023 with acute on chronic CHF. She was found to be in afib with RVR. Cardiology consulted. She was diuresed with lasix IV and discharged on higher dose of torsemide (increased from 60 mg daily to 60 mg in am and 40 mg in pm). Empagliflozin was started. Digoxin dose was increased and metoprolol continued for afib. She was diuresed for 14 lbs with weight 181 lbs at discharge. Estimated dry weight ~182 lbs. She discharged to [facility] where she made fair progress in therapies. Torsemide was decreased to 40 mg bid and losartan decreased to 12.5 mg daily for hypotension and weight 177 lbs. She received cipro for UTI.     Today's concern is:      Cellulitis of right lower extremity  Lymphedema  HFrEF (heart failure with reduced ejection fraction) (H)  Moderate vascular dementia without behavioral disturbance, psychotic disturbance, mood disturbance, or anxiety (H)  She is seen today after staff reported redness, warmth and weeping of her RLE for the past 1-2 days. Afebrile. She is a poor historian. Denies pain of her legs. Denies feeling ill. Wheelchair bound and requires assist of 1 with transfers and cares. Ambulating up to 100 ft with walker and assist during therapy sessions.    Allergies, and PMH/PSH reviewed in EPIC today    REVIEW OF SYSTEMS:  Unable to obtain due to dementia.  Positives as noted under HPI.       Objective:   /53   Pulse 61   Temp 98.1  F (36.7  C)   Resp 14   Wt 84.1 kg (185 lb 6.4 oz)   BMI 31.82 kg/m    GENERAL APPEARANCE:  Alert, in no distress  ENT:  [place], oropharynx clear  EYES:  conjunctiva and lids normal  NECK:  no adenopathy, no thyromegaly  RESP:  lungs clear to auscultation   CV:  regular rate and rhythm, 1/6 murmur, peripheral edema 2-3+ in both LE  ABDOMEN:  soft, non-tender, no distension, no masses  M/S:   wheelchair. DICKERSON with good strength. No joint inflammation  SKIN:  erythema and warmth of right ankle and lower leg,with scant weeping around the inner ankle, no open wound. Stasis changes of LLE, no weeping or open areas  PSYCH:  oriented to self, situation, insight and judgement impaired, memory impaired , affect and mood normal    Recent labs in [facility] reviewed by me today.     ASSESSMENT / PLAN:  (L03.115) Cellulitis of right lower extremity  (primary encounter diagnosis)  (I89.0) Lymphedema  Comment: cellulitis with mild weeping of right ankle and lower leg. She does not appear acutely ill.   Treatment options limited due to multiple medication allergies/intolerances. Discussed with her ,who confirms she has tolerated cipro in the past  Plan: cipro 500 mg bid X 7 days. Wound care using a foam dressing-keep clean and dry. Continue lymphedema management with [facility]     (I50.20) HFrEF (heart failure with reduced ejection fraction) (H)  Comment: weight is up 4 lbs since admission. BPs remain soft  Plan: continue current meds, including torsemide 40 mg bid. Follow weight, symptoms, BP. Staff assistance with transfers and mobility     (F01.B0) Moderate vascular dementia without behavioral disturbance, psychotic disturbance, mood disturbance, or anxiety (H)  Comment: appears to be adjusting well to the unit, pleasant and cooperative   Plan: Memory Care staff assistance with cares, meals, activities, med admin    :    Electronically  signed by: SHALONDA Kincaid CNP

## 2023-11-01 PROBLEM — F01.50 VASCULAR DEMENTIA (H): Status: ACTIVE | Noted: 2023-11-01

## 2023-11-01 PROBLEM — E11.9 DIABETES MELLITUS, TYPE 2 (H): Status: ACTIVE | Noted: 2023-11-01

## 2023-11-01 PROBLEM — N18.31 TYPE 2 DIABETES MELLITUS WITH STAGE 3A CHRONIC KIDNEY DISEASE, WITHOUT LONG-TERM CURRENT USE OF INSULIN (H): Status: ACTIVE | Noted: 2023-11-01

## 2023-11-01 PROBLEM — M06.9 RA (RHEUMATOID ARTHRITIS) (H): Status: ACTIVE | Noted: 2023-11-01

## 2023-11-01 PROBLEM — I89.0 LYMPHEDEMA: Status: ACTIVE | Noted: 2023-11-01

## 2023-11-01 PROBLEM — I25.5 ISCHEMIC CARDIOMYOPATHY: Status: ACTIVE | Noted: 2023-11-01

## 2023-11-01 PROBLEM — I73.9 PVD (PERIPHERAL VASCULAR DISEASE) (H): Status: ACTIVE | Noted: 2023-11-01

## 2023-11-01 PROBLEM — J45.909 UNCOMPLICATED ASTHMA: Status: ACTIVE | Noted: 2023-11-01

## 2023-11-01 PROBLEM — I25.10 CAD (CORONARY ARTERY DISEASE): Status: ACTIVE | Noted: 2023-11-01

## 2023-11-01 PROBLEM — I10 HTN (HYPERTENSION): Status: ACTIVE | Noted: 2023-11-01

## 2023-11-01 PROBLEM — F41.8 DEPRESSION WITH ANXIETY: Status: ACTIVE | Noted: 2023-11-01

## 2023-11-01 PROBLEM — I50.23 ACUTE ON CHRONIC HFREF (HEART FAILURE WITH REDUCED EJECTION FRACTION) (H): Status: ACTIVE | Noted: 2023-11-01

## 2023-11-01 PROBLEM — I07.1 SEVERE TRICUSPID REGURGITATION: Status: ACTIVE | Noted: 2023-11-01

## 2023-11-01 PROBLEM — E11.9 DIABETES MELLITUS, TYPE 2 (H): Status: RESOLVED | Noted: 2023-11-01 | Resolved: 2023-11-01

## 2023-11-01 PROBLEM — F01.50 VASCULAR DEMENTIA (H): Status: RESOLVED | Noted: 2023-11-01 | Resolved: 2023-11-01

## 2023-11-01 PROBLEM — N18.30 CKD (CHRONIC KIDNEY DISEASE) STAGE 3, GFR 30-59 ML/MIN (H): Status: ACTIVE | Noted: 2023-11-01

## 2023-11-01 PROBLEM — F32.A DEPRESSION: Status: ACTIVE | Noted: 2023-11-01

## 2023-11-01 PROBLEM — E11.22 TYPE 2 DIABETES MELLITUS WITH STAGE 3A CHRONIC KIDNEY DISEASE, WITHOUT LONG-TERM CURRENT USE OF INSULIN (H): Status: ACTIVE | Noted: 2023-11-01

## 2023-11-01 PROBLEM — I50.20 HFREF (HEART FAILURE WITH REDUCED EJECTION FRACTION) (H): Status: ACTIVE | Noted: 2023-11-01

## 2023-11-01 PROBLEM — K42.9 UMBILICAL HERNIA WITHOUT OBSTRUCTION AND WITHOUT GANGRENE: Status: ACTIVE | Noted: 2023-11-01

## 2023-11-01 PROBLEM — I50.23 ACUTE ON CHRONIC HFREF (HEART FAILURE WITH REDUCED EJECTION FRACTION) (H): Status: RESOLVED | Noted: 2023-11-01 | Resolved: 2023-11-01

## 2023-11-01 PROBLEM — I21.4 NSTEMI (NON-ST ELEVATED MYOCARDIAL INFARCTION) (H): Status: ACTIVE | Noted: 2023-11-01

## 2023-11-07 ENCOUNTER — ASSISTED LIVING VISIT (OUTPATIENT)
Dept: GERIATRICS | Facility: CLINIC | Age: 73
End: 2023-11-07
Payer: COMMERCIAL

## 2023-11-07 VITALS
DIASTOLIC BLOOD PRESSURE: 64 MMHG | HEART RATE: 80 BPM | BODY MASS INDEX: 30.55 KG/M2 | RESPIRATION RATE: 22 BRPM | SYSTOLIC BLOOD PRESSURE: 108 MMHG | TEMPERATURE: 98.4 F | WEIGHT: 178 LBS

## 2023-11-07 DIAGNOSIS — I89.0 LYMPHEDEMA: ICD-10-CM

## 2023-11-07 DIAGNOSIS — E11.22 TYPE 2 DIABETES MELLITUS WITH STAGE 3A CHRONIC KIDNEY DISEASE, WITHOUT LONG-TERM CURRENT USE OF INSULIN (H): ICD-10-CM

## 2023-11-07 DIAGNOSIS — F01.B0 MODERATE VASCULAR DEMENTIA WITHOUT BEHAVIORAL DISTURBANCE, PSYCHOTIC DISTURBANCE, MOOD DISTURBANCE, OR ANXIETY (H): ICD-10-CM

## 2023-11-07 DIAGNOSIS — I50.20 HFREF (HEART FAILURE WITH REDUCED EJECTION FRACTION) (H): ICD-10-CM

## 2023-11-07 DIAGNOSIS — N18.31 TYPE 2 DIABETES MELLITUS WITH STAGE 3A CHRONIC KIDNEY DISEASE, WITHOUT LONG-TERM CURRENT USE OF INSULIN (H): ICD-10-CM

## 2023-11-07 DIAGNOSIS — I48.20 CHRONIC ATRIAL FIBRILLATION (H): ICD-10-CM

## 2023-11-07 DIAGNOSIS — L03.115 CELLULITIS OF RIGHT LOWER EXTREMITY: Primary | ICD-10-CM

## 2023-11-07 PROCEDURE — 99349 HOME/RES VST EST MOD MDM 40: CPT | Performed by: NURSE PRACTITIONER

## 2023-11-07 NOTE — LETTER
"    11/7/2023        RE: Rachel Weiss  [facility]   [address] [facility] [address]        [facility] GERIATRICS    Chief Complaint   Patient presents with     RECHECK     HPI:  Rachel Weiss is a 73 year old  (1950), who is being seen today for an episodic care visit at: THE [facility] ([facility]) [[id number]].   She admitted to Memory Care at this facility 9/26/2023 for a higher level of care following hospitalization and tcu stay.   Medical history significant for vascular dementia, HFrEF, afib, CAD with STEMI and stent to LAD 11/2021, ischemic cardiomyopathy, PVD, lymphedema, diabetes type 2, CKD stage 3, HTN, asthma, rheumatoid arthritis, depression, anxiety.   She was hospitalized at [facility] 9/6-9/13/2023 with acute on chronic CHF. She was found to be in afib with RVR. Cardiology consulted. She was diuresed with lasix IV and discharged on higher dose of torsemide (increased from 60 mg daily to 60 mg in am and 40 mg in pm). Empagliflozin was started. Digoxin dose was increased and metoprolol continued for afib. She was diuresed for 14 lbs with weight 181 lbs at discharge. Estimated dry weight ~182 lbs. She discharged to [facility] where she made fair progress in therapies. Torsemide was decreased to 40 mg bid and losartan decreased to 12.5 mg daily for hypotension and weight 177 lbs. She received cipro for UTI.       Today's concern is:      Cellulitis of right lower extremity  Lymphedema  HFrEF (heart failure with reduced ejection fraction) (H)  Chronic atrial fibrillation (H)  Type 2 diabetes mellitus with stage 3a chronic kidney disease, without long-term current use of insulin (H)  Moderate vascular dementia without behavioral disturbance, psychotic disturbance, mood disturbance, or anxiety (H)  She is seen today to follow up on cellulitis and an open wound of her RLE. She is a poor historian. Denies feeling ill. Denies pain. \"I feel ok.\" Wheelchair bound, " "able to stand for transfers and toileting with assist of 1. Requires assist with all cares. Staff reports no new issues. She completed home PHYSICAL THERAPY, continues to be followed for lymphedema management.   Spoke with her  Madhu, who feels she's \"doing as well as I can expect her to.\"       Allergies, and PMH/PSH reviewed in EPIC today    REVIEW OF SYSTEMS:  Limited due to dementia. Positives as noted under HPI     Objective:   /64   Pulse 80   Temp 98.4  F (36.9  C)   Resp 22   Wt 80.7 kg (178 lb)   BMI 30.55 kg/m    GENERAL APPEARANCE:  Alert, in no distress  ENT:  [place], oropharynx clear  EYES:  conjunctiva and lids normal  NECK:  no adenopathy, no thyromegaly  RESP:  lungs clear to auscultation   CV:  regular rate and rhythm, 1/6 murmur, peripheral 2+ both LE  ABDOMEN:  soft, non-tender, no distension, no masses  M/S:   in recliner. DICKERSON with good strength. No joint inflammation  SKIN:  superficial open wound right inner ankle approx 3 cm with granulation, clean, no drainage. Stasis changes both LE  PSYCH:  oriented to self, situation, insight and judgement impaired, memory impaired , affect and mood normal    Recent labs in [facility] reviewed by me today.       ASSESSMENT / PLAN:  (L03.115) Cellulitis of right lower extremity  (primary encounter diagnosis)  Comment: completed cipro 10/26/2023, infection appears resolved. Wound healing well.   Plan: continue foam dressing to inner ankle wound, change every 3 days and prn.     (I89.0) Lymphedema  Comment: edema has improved   Plan: continue lymphedema management with [facility]. Continue torsemide.     (I50.20) HFrEF (heart failure with reduced ejection fraction) (H)  Comment: appears euvolemic. Weight is down 7 lbs to 178 lbs, estimated dry weight ~182 lbs.   EF 30%, severe TR on ECHO.   Plan: continue torsemide 40 mg bid, digoxin, metoprolol, losartan, Jardiance. Follow weight, symptoms and adjust dose as indicated.     (I48.20) Chronic " atrial fibrillation (H)  Comment: rate controlled: 79-90  History of left atrial appendage thrombus, resolved   Plan: continue apixaban, digoxin, metoprolol.     (E11.22,  N18.31) Type 2 diabetes mellitus with stage 3a chronic kidney disease, without long-term current use of insulin (H)  Comment: blood glucose: . HgbA1c 6.7 on 10/19/2023. Jardiance started while inpatient for CHF  Plan: continue Jardiance, metformin, Trulicity. Monitor blood glucose. Avoid hypoglycemia due to advanced age and fall risk.     (F01.B0) Moderate vascular dementia without behavioral disturbance, psychotic disturbance, mood disturbance, or anxiety (H)  Comment: moderate to severe deficits, low functional status. Appears to be adjusting to the unit   Plan: Memory Care staff assistance with cares, meals, activities, med admin        Electronically signed by: SHALONDA Kincaid CNP           Sincerely,        SHALONDA Kincaid CNP

## 2023-11-07 NOTE — PROGRESS NOTES
"[facility] GERIATRICS    Chief Complaint   Patient presents with    RECHECK     HPI:  Rachel Weiss is a 73 year old  (1950), who is being seen today for an episodic care visit at: THE [facility] ([facility]) [[id number]].   She admitted to Memory Care at this facility 9/26/2023 for a higher level of care following hospitalization and tcu stay.   Medical history significant for vascular dementia, HFrEF, afib, CAD with STEMI and stent to LAD 11/2021, ischemic cardiomyopathy, PVD, lymphedema, diabetes type 2, CKD stage 3, HTN, asthma, rheumatoid arthritis, depression, anxiety.   She was hospitalized at [facility] 9/6-9/13/2023 with acute on chronic CHF. She was found to be in afib with RVR. Cardiology consulted. She was diuresed with lasix IV and discharged on higher dose of torsemide (increased from 60 mg daily to 60 mg in am and 40 mg in pm). Empagliflozin was started. Digoxin dose was increased and metoprolol continued for afib. She was diuresed for 14 lbs with weight 181 lbs at discharge. Estimated dry weight ~182 lbs. She discharged to [facility] where she made fair progress in therapies. Torsemide was decreased to 40 mg bid and losartan decreased to 12.5 mg daily for hypotension and weight 177 lbs. She received cipro for UTI.       Today's concern is:      Cellulitis of right lower extremity  Lymphedema  HFrEF (heart failure with reduced ejection fraction) (H)  Chronic atrial fibrillation (H)  Type 2 diabetes mellitus with stage 3a chronic kidney disease, without long-term current use of insulin (H)  Moderate vascular dementia without behavioral disturbance, psychotic disturbance, mood disturbance, or anxiety (H)  She is seen today to follow up on cellulitis and an open wound of her RLE. She is a poor historian. Denies feeling ill. Denies pain. \"I feel ok.\" Wheelchair bound, able to stand for transfers and toileting with assist of 1. Requires assist with all cares. Staff reports no new issues. " "She completed home PHYSICAL THERAPY, continues to be followed for lymphedema management.   Spoke with her  Madhu, who feels she's \"doing as well as I can expect her to.\"       Allergies, and PMH/PSH reviewed in EPIC today    REVIEW OF SYSTEMS:  Limited due to dementia. Positives as noted under HPI     Objective:   /64   Pulse 80   Temp 98.4  F (36.9  C)   Resp 22   Wt 80.7 kg (178 lb)   BMI 30.55 kg/m    GENERAL APPEARANCE:  Alert, in no distress  ENT:  [place], oropharynx clear  EYES:  conjunctiva and lids normal  NECK:  no adenopathy, no thyromegaly  RESP:  lungs clear to auscultation   CV:  regular rate and rhythm, 1/6 murmur, peripheral 2+ both LE  ABDOMEN:  soft, non-tender, no distension, no masses  M/S:   in recliner. DICKERSON with good strength. No joint inflammation  SKIN:  superficial open wound right inner ankle approx 3 cm with granulation, clean, no drainage. Stasis changes both LE  PSYCH:  oriented to self, situation, insight and judgement impaired, memory impaired , affect and mood normal    Recent labs in [facility] reviewed by me today.       ASSESSMENT / PLAN:  (L03.115) Cellulitis of right lower extremity  (primary encounter diagnosis)  Comment: completed cipro 10/26/2023, infection appears resolved. Wound healing well.   Plan: continue foam dressing to inner ankle wound, change every 3 days and prn.     (I89.0) Lymphedema  Comment: edema has improved   Plan: continue lymphedema management with [facility]. Continue torsemide.     (I50.20) HFrEF (heart failure with reduced ejection fraction) (H)  Comment: appears euvolemic. Weight is down 7 lbs to 178 lbs, estimated dry weight ~182 lbs.   EF 30%, severe TR on ECHO.   Plan: continue torsemide 40 mg bid, digoxin, metoprolol, losartan, Jardiance. Follow weight, symptoms and adjust dose as indicated.     (I48.20) Chronic atrial fibrillation (H)  Comment: rate controlled: 79-90  History of left atrial appendage thrombus, resolved   Plan: " continue apixaban, digoxin, metoprolol.     (E11.22,  N18.31) Type 2 diabetes mellitus with stage 3a chronic kidney disease, without long-term current use of insulin (H)  Comment: blood glucose: . HgbA1c 6.7 on 10/19/2023. Jardiance started while inpatient for CHF  Plan: continue Jardiance, metformin, Trulicity. Monitor blood glucose. Avoid hypoglycemia due to advanced age and fall risk.     (F01.B0) Moderate vascular dementia without behavioral disturbance, psychotic disturbance, mood disturbance, or anxiety (H)  Comment: moderate to severe deficits, low functional status. Appears to be adjusting to the unit   Plan: Memory Care staff assistance with cares, meals, activities, med admin        Electronically signed by: SHALONDA Kincaid CNP

## 2023-11-21 ENCOUNTER — ASSISTED LIVING VISIT (OUTPATIENT)
Dept: GERIATRICS | Facility: CLINIC | Age: 73
End: 2023-11-21
Payer: COMMERCIAL

## 2023-11-21 VITALS
WEIGHT: 170 LBS | HEART RATE: 80 BPM | SYSTOLIC BLOOD PRESSURE: 129 MMHG | BODY MASS INDEX: 29.18 KG/M2 | RESPIRATION RATE: 19 BRPM | TEMPERATURE: 97.3 F | DIASTOLIC BLOOD PRESSURE: 65 MMHG

## 2023-11-21 DIAGNOSIS — F41.8 DEPRESSION WITH ANXIETY: ICD-10-CM

## 2023-11-21 DIAGNOSIS — F01.B0 MODERATE VASCULAR DEMENTIA WITHOUT BEHAVIORAL DISTURBANCE, PSYCHOTIC DISTURBANCE, MOOD DISTURBANCE, OR ANXIETY (H): Primary | ICD-10-CM

## 2023-11-21 DIAGNOSIS — R52 PAIN: ICD-10-CM

## 2023-11-21 DIAGNOSIS — I89.0 LYMPHEDEMA: ICD-10-CM

## 2023-11-21 DIAGNOSIS — I50.20 HFREF (HEART FAILURE WITH REDUCED EJECTION FRACTION) (H): ICD-10-CM

## 2023-11-21 DIAGNOSIS — E11.22 TYPE 2 DIABETES MELLITUS WITH STAGE 3A CHRONIC KIDNEY DISEASE, WITHOUT LONG-TERM CURRENT USE OF INSULIN (H): ICD-10-CM

## 2023-11-21 DIAGNOSIS — N18.31 TYPE 2 DIABETES MELLITUS WITH STAGE 3A CHRONIC KIDNEY DISEASE, WITHOUT LONG-TERM CURRENT USE OF INSULIN (H): ICD-10-CM

## 2023-11-21 PROCEDURE — 99349 HOME/RES VST EST MOD MDM 40: CPT | Performed by: NURSE PRACTITIONER

## 2023-11-21 RX ORDER — ACETAMINOPHEN 325 MG/1
650 TABLET ORAL EVERY 4 HOURS PRN
Qty: 30 TABLET | Refills: 11 | Status: SHIPPED | OUTPATIENT
Start: 2023-11-21

## 2023-11-21 NOTE — PROGRESS NOTES
[facility] GERIATRICS    Chief Complaint   Patient presents with    RECHECK     HPI:  Rachel Weiss is a 73 year old  (1950), who is being seen today for an episodic care visit at: THE [facility] ([facility]) [[id number]].   She admitted to Memory Care at this facility 9/26/2023 for a higher level of care following hospitalization and tcu stay.   Medical history significant for vascular dementia, HFrEF, afib, CAD with STEMI and stent to LAD 11/2021, ischemic cardiomyopathy, PVD, lymphedema, diabetes type 2, CKD stage 3, HTN, asthma, rheumatoid arthritis, depression, anxiety.   She was hospitalized at [facility] 9/6-9/13/2023 with acute on chronic CHF. She was found to be in afib with RVR. Cardiology consulted. She was diuresed with lasix IV and discharged on higher dose of torsemide (increased from 60 mg daily to 60 mg in am and 40 mg in pm). Empagliflozin was started. Digoxin dose was increased and metoprolol continued for afib. She was diuresed for 14 lbs with weight 181 lbs at discharge. Estimated dry weight ~182 lbs. She discharged to [facility] where she made fair progress in therapies. Torsemide was decreased to 40 mg bid and losartan decreased to 12.5 mg daily for hypotension and weight 177 lbs. She received cipro for UTI.       Today's concern is:      Moderate vascular dementia without behavioral disturbance, psychotic disturbance, mood disturbance, or anxiety (H)  Depression with anxiety  HFrEF (heart failure with reduced ejection fraction) (H)  Type 2 diabetes mellitus with stage 3a chronic kidney disease, without long-term current use of insulin (H)  Lymphedema  Pain  She is seen today after staff reported that her  is concerned about hallucinations. She is alert and talkative, responds to questions appropriately, though is a poor historian. Reports feeling well. Denies pain of any type. Good appetite. Afebrile. Staff requests tylenol prn for occasional reports of LE pain.   Spoke  with her  Madhu, who reports he's noticed sundowning with increased confusion and anxiety later in the day, but not hallucinations.       Allergies, and PMH/PSH reviewed in EPIC today    REVIEW OF SYSTEMS:  Limited due to dementia. Positives as noted under HPI    Objective:   /65   Pulse 80   Temp 97.3  F (36.3  C)   Resp 19   Wt 77.1 kg (170 lb)   BMI 29.18 kg/m    GENERAL APPEARANCE:  Alert, in no distress  ENT:  [place], oropharynx clear  EYES:  conjunctiva and lids normal  NECK:  no adenopathy, no thyromegaly  RESP:  lungs clear to auscultation   CV:  regular rate and rhythm, 1/6 murmur, peripheral 2+ both LE  ABDOMEN:  soft, non-tender, no distension, no masses  M/S:  wheelchair. DICKERSON with good strength. No joint inflammation  SKIN: dressing clean, dry, intact to right LE. No rashes   PSYCH:  oriented to self, situation, insight and judgement impaired, memory impaired , affect and mood normal    Recent labs in [facility] reviewed by me today.       ASSESSMENT / PLAN:  (F01.B0) Moderate vascular dementia without behavioral disturbance, psychotic disturbance, mood disturbance, or anxiety (H)  (primary encounter diagnosis)  (F41.8) Depression with anxiety  Comment: moderate deficits. Questionable hallucinations, though no signs of this today. Sundowning with anxiety in the evenings   Plan: increase sertraline to 50 mg daily. Closely monitor for hallucinations/psychosis. Memory Care staff assistance with cares, meals, activities, med admin   agrees with plan of care  Discussed with staff     (I50.20) HFrEF (heart failure with reduced ejection fraction) (H)  Comment: appears euvolemic. Weights have ranged 165-180 lbs since admission.   EF 30%, severe TR on ECHO  Plan: continue torsemide 40 mg bid, digoxin, metoprolol, losartan, Jardiance. Follow weight, symptoms.     (E11.22,  N18.31) Type 2 diabetes mellitus with stage 3a chronic kidney disease, without long-term current use of insulin (H)  Comment:  blood glucose: , outlier 282. HgbA1c 6.7 on 10/19/2023. Question if she's having hypoglycemia that may be contributing to episodes of increased confusion. Jardiance was started while inpatient for CHF  Plan: hold Trulicity X 2 weeks and reassess. Continue metformin, Jardiance. Monitor blood glucose.     (I89.0) Lymphedema  Comment: chronic, improved. She is not compliant with compression and elevating her legs   Plan: continue torsemide. Continue lymphedema therapy with [facility]     (R52) Pain  Comment: intermittent LE pain. No s/s of injury and she denies pain today   Plan: tylenol prn           Electronically signed by: SHALONDA Kincaid CNP

## 2023-11-21 NOTE — LETTER
11/21/2023        RE: Rachel Weiss  [facility]   [address] [facility] [id number]        [facility] GERIATR[facility]    Chief Complaint   Patient presents with     RECHECK     HPI:  Rachel Weiss is a 73 year old  (1950), who is being seen today for an episodic care visit at: THE [facility] ([facility]) [[id number]].   She admitted to Memory Care at this facility 9/26/2023 for a higher level of care following hospitalization and tcu stay.   Medical history significant for vascular dementia, HFrEF, afib, CAD with STEMI and stent to LAD 11/2021, ischemic cardiomyopathy, PVD, lymphedema, diabetes type 2, CKD stage 3, HTN, asthma, rheumatoid arthritis, depression, anxiety.   She was hospitalized at [facility] 9/6-9/13/2023 with acute on chronic CHF. She was found to be in afib with RVR. Cardiology consulted. She was diuresed with lasix IV and discharged on higher dose of torsemide (increased from 60 mg daily to 60 mg in am and 40 mg in pm). Empagliflozin was started. Digoxin dose was increased and metoprolol continued for afib. She was diuresed for 14 lbs with weight 181 lbs at discharge. Estimated dry weight ~182 lbs. She discharged to [facility] where she made fair progress in therapies. Torsemide was decreased to 40 mg bid and losartan decreased to 12.5 mg daily for hypotension and weight 177 lbs. She received cipro for UTI.       Today's concern is:      Moderate vascular dementia without behavioral disturbance, psychotic disturbance, mood disturbance, or anxiety (H)  Depression with anxiety  HFrEF (heart failure with reduced ejection fraction) (H)  Type 2 diabetes mellitus with stage 3a chronic kidney disease, without long-term current use of insulin (H)  Lymphedema  Pain  She is seen today after staff reported that her  is concerned about hallucinations. She is alert and talkative, responds to questions appropriately, though is a poor historian. Reports feeling well.  Denies pain of any type. Good appetite. Afebrile. Staff requests tylenol prn for occasional reports of LE pain.   Spoke with her  Madhu, who reports he's noticed sundowning with increased confusion and anxiety later in the day, but not hallucinations.       Allergies, and PMH/PSH reviewed in EPIC today    REVIEW OF SYSTEMS:  Limited due to dementia. Positives as noted under HPI    Objective:   /65   Pulse 80   Temp 97.3  F (36.3  C)   Resp 19   Wt 77.1 kg (170 lb)   BMI 29.18 kg/m    GENERAL APPEARANCE:  Alert, in no distress  ENT:  [place], oropharynx clear  EYES:  conjunctiva and lids normal  NECK:  no adenopathy, no thyromegaly  RESP:  lungs clear to auscultation   CV:  regular rate and rhythm, 1/6 murmur, peripheral 2+ both LE  ABDOMEN:  soft, non-tender, no distension, no masses  M/S:  wheelchair. DICKERSON with good strength. No joint inflammation  SKIN: dressing clean, dry, intact to right LE. No rashes   PSYCH:  oriented to self, situation, insight and judgement impaired, memory impaired , affect and mood normal    Recent labs in [facility] reviewed by me today.       ASSESSMENT / PLAN:  (F01.B0) Moderate vascular dementia without behavioral disturbance, psychotic disturbance, mood disturbance, or anxiety (H)  (primary encounter diagnosis)  (F41.8) Depression with anxiety  Comment: moderate deficits. Questionable hallucinations, though no signs of this today. Sundowning with anxiety in the evenings   Plan: increase sertraline to 50 mg daily. Closely monitor for hallucinations/psychosis. Memory Care staff assistance with cares, meals, activities, med admin   agrees with plan of care  Discussed with staff     (I50.20) HFrEF (heart failure with reduced ejection fraction) (H)  Comment: appears euvolemic. Weights have ranged 165-180 lbs since admission.   EF 30%, severe TR on ECHO  Plan: continue torsemide 40 mg bid, digoxin, metoprolol, losartan, Jardiance. Follow weight, symptoms.     (E11.22,  N18.31)  Type 2 diabetes mellitus with stage 3a chronic kidney disease, without long-term current use of insulin (H)  Comment: blood glucose: , outlier 282. HgbA1c 6.7 on 10/19/2023. Question if she's having hypoglycemia that may be contributing to episodes of increased confusion. Jardiance was started while inpatient for CHF  Plan: hold Trulicity X 2 weeks and reassess. Continue metformin, Jardiance. Monitor blood glucose.     (I89.0) Lymphedema  Comment: chronic, improved. She is not compliant with compression and elevating her legs   Plan: continue torsemide. Continue lymphedema therapy with [facility]     (R52) Pain  Comment: intermittent LE pain. No s/s of injury and she denies pain today   Plan: tylenol prn           Electronically signed by: SHALONDA Kincaid CNP           Sincerely,        SHALONDA Kincaid CNP

## 2023-11-24 PROBLEM — I05.0 MITRAL STENOSIS: Status: ACTIVE | Noted: 2022-11-04

## 2023-11-24 PROBLEM — M15.9 GENERALIZED OSTEOARTHRITIS: Status: ACTIVE | Noted: 2023-11-24

## 2023-11-24 PROBLEM — N17.9 AKI (ACUTE KIDNEY INJURY) (H): Status: ACTIVE | Noted: 2023-04-03

## 2023-11-24 PROBLEM — R10.9 ABDOMINAL PAIN: Status: ACTIVE | Noted: 2023-04-03

## 2023-11-24 PROBLEM — R79.89 ABNORMAL LFTS: Status: ACTIVE | Noted: 2023-04-03

## 2023-11-24 PROBLEM — I70.0 ATHEROSCLEROSIS OF AORTA (H): Status: ACTIVE | Noted: 2023-11-24

## 2023-11-24 PROBLEM — R82.81 PYURIA: Status: ACTIVE | Noted: 2023-04-03

## 2023-11-24 PROBLEM — J30.9 CHRONIC ALLERGIC RHINITIS: Status: ACTIVE | Noted: 2023-11-24

## 2023-11-24 PROBLEM — M85.80 OSTEOPENIA: Status: ACTIVE | Noted: 2017-03-30

## 2023-11-24 PROBLEM — F03.90 DEMENTIA (H): Status: ACTIVE | Noted: 2022-01-12

## 2023-11-24 PROBLEM — H90.3 SENSORINEURAL HEARING LOSS, BILATERAL: Status: ACTIVE | Noted: 2021-03-29

## 2023-11-24 PROBLEM — L40.8 OTHER PSORIASIS: Status: ACTIVE | Noted: 2023-11-24

## 2023-12-14 ENCOUNTER — ASSISTED LIVING VISIT (OUTPATIENT)
Dept: GERIATRICS | Facility: CLINIC | Age: 73
End: 2023-12-14
Payer: COMMERCIAL

## 2023-12-14 VITALS
RESPIRATION RATE: 20 BRPM | SYSTOLIC BLOOD PRESSURE: 156 MMHG | DIASTOLIC BLOOD PRESSURE: 85 MMHG | HEART RATE: 82 BPM | BODY MASS INDEX: 28.82 KG/M2 | WEIGHT: 167.9 LBS | TEMPERATURE: 98.2 F

## 2023-12-14 DIAGNOSIS — I25.10 CORONARY ARTERY DISEASE INVOLVING NATIVE CORONARY ARTERY OF NATIVE HEART WITHOUT ANGINA PECTORIS: ICD-10-CM

## 2023-12-14 DIAGNOSIS — N18.31 TYPE 2 DIABETES MELLITUS WITH STAGE 3A CHRONIC KIDNEY DISEASE, WITHOUT LONG-TERM CURRENT USE OF INSULIN (H): ICD-10-CM

## 2023-12-14 DIAGNOSIS — I48.20 CHRONIC ATRIAL FIBRILLATION (H): ICD-10-CM

## 2023-12-14 DIAGNOSIS — R53.81 PHYSICAL DECONDITIONING: ICD-10-CM

## 2023-12-14 DIAGNOSIS — M25.552 HIP PAIN, LEFT: Primary | ICD-10-CM

## 2023-12-14 DIAGNOSIS — I89.0 LYMPHEDEMA: ICD-10-CM

## 2023-12-14 DIAGNOSIS — E11.22 TYPE 2 DIABETES MELLITUS WITH STAGE 3A CHRONIC KIDNEY DISEASE, WITHOUT LONG-TERM CURRENT USE OF INSULIN (H): ICD-10-CM

## 2023-12-14 DIAGNOSIS — F41.8 DEPRESSION WITH ANXIETY: ICD-10-CM

## 2023-12-14 DIAGNOSIS — F01.B0 MODERATE VASCULAR DEMENTIA WITHOUT BEHAVIORAL DISTURBANCE, PSYCHOTIC DISTURBANCE, MOOD DISTURBANCE, OR ANXIETY (H): ICD-10-CM

## 2023-12-14 DIAGNOSIS — I10 PRIMARY HYPERTENSION: ICD-10-CM

## 2023-12-14 DIAGNOSIS — I50.20 HFREF (HEART FAILURE WITH REDUCED EJECTION FRACTION) (H): ICD-10-CM

## 2023-12-14 PROCEDURE — 99348 HOME/RES VST EST LOW MDM 30: CPT | Performed by: NURSE PRACTITIONER

## 2023-12-14 RX ORDER — LOSARTAN POTASSIUM 25 MG/1
25 TABLET ORAL DAILY
COMMUNITY
End: 2024-01-15

## 2023-12-14 NOTE — LETTER
12/14/2023        RE: Rachel Weiss  [facility]   [address] [facility] [address]        [facility] GERIATRICS    PRIMARY CARE PROVIDER AND CLINIC:  SHALONDA Kincaid [facility], [address] [facility] [id number]  Chief Complaint   Patient presents with     Hospital F/U      [place] Medical Record Number:  [id number]  Place of Service where encounter took place:  THE [facility] ([facility]) [[id number]]    Rachel Weiss  is a 73 year old  (1950), admitted to the above facility from  [facility] . Hospital stay 11/28/23 through 12/5/23.    HPI:    She admitted to Memory Care at this facility 9/26/2023 for a higher level of care following hospitalization and tcu stay.   Medical history significant for vascular dementia, HFrEF, afib, CAD with STEMI and stent to LAD 11/2021, ischemic cardiomyopathy, PVD, lymphedema, diabetes type 2, CKD stage 3, HTN, asthma, rheumatoid arthritis, depression, anxiety.   She was hospitalized at [facility] 9/6-9/13/2023 with acute on chronic CHF. She was found to be in afib with RVR. Cardiology consulted. She was diuresed with lasix IV and discharged on higher dose of torsemide (increased from 60 mg daily to 60 mg in am and 40 mg in pm). Empagliflozin was started. Digoxin dose was increased and metoprolol continued for afib. She was diuresed for 14 lbs with weight 181 lbs at discharge. Estimated dry weight ~182 lbs. She discharged to [facility]CU where she made fair progress in therapies. Torsemide was decreased to 40 mg bid and losartan decreased to 12.5 mg daily for hypotension and weight 177 lbs. She received cipro for UTI.      She was sent to the [facility] ED 11/28/2023 with acute onset of severe left hip pain. CT showed inflammation of the left iliopsoas muscle. She was unable to tolerate MRI. Ortho was concerned for occult fracture and plan was for MRI under general anesthesia, which was cancelled by Anesthesia due  "to potential risk. Pain spontaneously resolved and she was able to participate in therapies. Losartan was increased back to 25 mg daily.     She is a poor historian. Pleasant and talkative, conversation appropriate. Denies pain of any type. Feeling well with good appetite. Reports edema of her legs is \"pretty good.\" Wheelchair bound, able to stand for transfers. Requires assist of 1 with cares.   Spoke with her  Madhu, who is concerned about her declining mobility and requests resuming home therapies. He also reports increased sundowning and anxiety during the night.     CODE STATUS/ADVANCE DIRECTIVES DISCUSSION:  No CPR- Do NOT Intubate    ALLERGIES:   Allergies   Allergen Reactions     Shellfish Allergy Shortness Of Breath, Anaphylaxis and Hives     Shellfish-Derived Products Shortness Of Breath, Anaphylaxis and Hives     Cefuroxime Other (See Comments)     Odd throat sensations - mucus, trouble swallowing (10/2013)     Celecoxib GI Disturbance     Levofloxacin Dizziness, Diarrhea and GI Disturbance     And arthralgia     Amoxicillin Unknown     Azithromycin Hives     Citalopram Other (See Comments)     Flushing     Doxycycline GI Disturbance     Bloating     Lodine [Etodolac] Unknown     Nitrofurantoin Other (See Comments)     Paresthesias     Penicillins Hives and Rash     Sulfa Antibiotics Hives     Takes Methotrexate     Sulfamethoxazole-Trimethoprim Rash     Sulindac Swelling     Swelling in hands plus gas, bloating, and loss of appetite     Valacyclovir Diarrhea      PAST MEDICAL HISTORY:   Past Medical History:   Diagnosis Date     A-fib (H)      Arthritis      CAD (coronary artery disease)      CKD (chronic kidney disease) stage 3, GFR 30-59 ml/min (H)      Depression with anxiety      Diabetes mellitus, type 2 (H)      Diverticulitis     perforation 10/2013     HFrEF (heart failure with reduced ejection fraction) (H)      HTN (hypertension)      Ischemic cardiomyopathy      Lymphedema      NSTEMI " (non-ST elevated myocardial infarction) (H) 11/2021    stent to LAD     PVD (peripheral vascular disease) (H24)      RA (rheumatoid arthritis) (H)      Severe tricuspid regurgitation      Shingles 2006     Umbilical hernia without obstruction and without gangrene      Uncomplicated asthma      Vascular dementia (H)       PAST SURGICAL HISTORY:   has a past surgical history that includes tonsillectomy; Cataract Extraction (Bilateral); Stent; and Cholecystectomy (04/2023).  FAMILY HISTORY: family history includes Cancer in her brother; Hypertension in her brother and mother; Throat cancer in her father.  SOCIAL HISTORY:   reports that she has never smoked. She has never used smokeless tobacco. She reports that she does not currently use alcohol. She reports that she does not use drugs.  Patient's living condition: lives in an assisted living facility    Post Discharge Medication Reconciliation Status:   MED REC REQUIRED  Post Medication Reconciliation Status: discharge medications reconciled and changed, per note/orders       Current Outpatient Medications   Medication Sig     acetaminophen (TYLENOL) 325 MG tablet Take 2 tablets (650 mg) by mouth every 4 hours as needed for mild pain     apixaban ANTICOAGULANT (ELIQUIS) 5 MG tablet Take 5 mg by mouth 2 times daily     digoxin (LANOXIN) 125 MCG tablet Take 1 tablet (125 mcg) by mouth daily     dimethicone-zinc oxide (ANSLEY PROTECT) external cream Apply topically daily     dulaglutide (TRULICITY) 0.75 MG/0.5ML pen Inject 0.75 mg Subcutaneous every 7 days     empagliflozin (JARDIANCE) 10 MG TABS tablet Take 1 tablet (10 mg) by mouth daily     folic acid (FOLVITE) 400 MCG tablet Take 1 tablet (400 mcg) by mouth 2 times daily     losartan (COZAAR) 25 MG tablet Take 25 mg by mouth daily     metFORMIN (GLUCOPHAGE) 500 MG tablet Take 2 tablets (1,000 mg) by mouth 2 times daily (with meals)     metoprolol succinate ER (TOPROL XL) 25 MG 24 hr tablet Take 1 tablet (25 mg) by  mouth daily     multivitamin, therapeutic (THERA-VIT) TABS tablet Take 1 tablet by mouth daily     nitroGLYcerin (NITROSTAT) 0.4 MG sublingual tablet Place 0.4 mg under the tongue every 5 minutes as needed for chest pain For chest pain place 1 tablet under the tongue every 5 minutes for 3 doses. If symptoms persist 5 minutes after 1st dose call 911.     potassium chloride ER (K-TAB) 20 MEQ CR tablet Take 1 tablet (20 mEq) by mouth 2 times daily     rosuvastatin (CRESTOR) 10 MG tablet Take 1 tablet (10 mg) by mouth daily     sertraline (ZOLOFT) 50 MG tablet Take 1 tablet (50 mg) by mouth daily     spironolactone (ALDACTONE) 25 MG tablet Take 0.5 tablets (12.5 mg) by mouth daily     torsemide (DEMADEX) 20 MG tablet Take 2 tablets (40 mg) by mouth 2 times daily     vitamin D3 (CHOLECALCIFEROL) 50 mcg (2000 units) tablet Take 1 tablet (50 mcg) by mouth daily     No current facility-administered medications for this visit.       ROS:  Limited due to dementia. Positives as noted under HPI    Vitals:  BP (!) 156/85   Pulse 82   Temp 98.2  F (36.8  C)   Resp 20   Wt 76.2 kg (167 lb 14.4 oz)   BMI 28.82 kg/m    Exam:  GENERAL APPEARANCE:  Alert, in no distress  ENT:  [place], oropharynx clear  EYES:  conjunctiva and lids normal  NECK:  no adenopathy, no thyromegaly  RESP:  lungs clear to auscultation   CV:  regular rate and rhythm, 1/6 murmur, compression wraps on both LE  ABDOMEN:  soft, non-tender, no distension, no masses  M/S:   wheelchair. DICKERSON. No joint inflammation. No tenderness to palpation left hip   SKIN:  no rashes or open areas  PSYCH:  oriented to self, situation, insight and judgement impaired, memory impaired , affect and mood normal    Lab/Diagnostic data:  Recent labs in [facility] reviewed by me today.       ASSESSMENT / PLAN:  (I45.193) Hip pain, left  (primary encounter diagnosis)  Comment: felt to be due to inflammation of left iliopsoas muscle. Pain resolved  Plan: tylenol prn. Home therapies     (F01.B0)  Moderate vascular dementia without behavioral disturbance, psychotic disturbance, mood disturbance, or anxiety (H)  (F41.8) Depression with anxiety  Comment: moderate deficits, low functional status. Worsening anxiety and poor sleep at night.   Sertraline was increased 11/21/2023  Plan: start trazodone 25 mg HS, continue sertraline 50 mg daily. Adjust doses as indicated. Memory Care staff assistance with cares, meals, activities, med admin   agrees with plan of care    (E11.22,  N18.31) Type 2 diabetes mellitus with stage 3a chronic kidney disease, without long-term current use of insulin (H)  Comment: blood glucose: 107-175, most readings are less than 150. HgbA1c 6.7 on 10/19/2023. Concern that she may be having episodes of hypoglycemia that could be contributing to fatigue and low functional status.   Plan: hold Trulicity X 2 weeks and review blood glucose. Continue metformin 1000 mg bid, Jardiance 10 mg daily. Monitor blood glucose     (I48.20) Chronic atrial fibrillation (H)  Comment: rate controlled: 80-82  Plan: continue apixaban 5 mg bid, metoprolol ER 25 mg daily, digoxin 125 mcg daily. Check digoxin level     (I50.20) HFrEF (heart failure with reduced ejection fraction) (H)  Comment: weight is down 13 lbs from admission to 167 lbs. Oral intake is good  Jardiance was started by Cardiology during 9/2023 hospitalization.   Plan: continue torsemide 40 mg bid, spironolactone 12.5 mg daily, digoxin 125 mcg daily, Jardiance 10 mg daily, losartan 25 mg daily, metoprolol ER 25 mg daily. Follow weight, symptoms. BMP, digoxin level next lab day.     (I89.0) Lymphedema  Comment: LE edema is down today   Plan: torsemide as above. Continue compression wraps. Elevate legs.     (I25.10) Coronary artery disease involving native coronary artery of native heart without angina pectoris  Comment: no chest pain or acute issues   Plan: cardiac meds as above. Continue statin, nitrostat prn    (I10) Primary  hypertension  Comment: recent BPs: 156/85, 129/65  Plan: continue metoprolol ER 25 mg daily, losartan 25 mg daily, torsemide 40 mg bid. Monitor VS and adjust meds as indicated.     (R53.81) Physical deconditioning  Comment: due to recent hospitalization and multiple comorbidities   Plan: [facility] to [facility] for PHYSICAL THERAPY/OT       Electronically signed by:  SHALONDA Kincaid CNP                       Documentation of Face-to-Face and Certification for Home Health Services     Patient: Rachel Weiss   YOB: 1950  MR Number: [id number]  Today's Date: 12/15/2023    I certify that patient: Rachel Weiss is under my care and that I, or a nurse practitioner or physician's assistant working with me, had a face-to-face encounter that meets the physician face-to-face encounter requirements with this patient on: 12/14/2023.    This encounter with the patient was in whole, or in part, for the following medical condition, which is the primary reason for home health care: physical deconditioning, left hip pain.    I certify that, based on my findings, the following services are medically necessary home health services: Occupational Therapy and Physical Therapy.    My clinical findings support the need for the above services because: Occupational Therapy Services are needed to assess and treat cognitive ability and address ADL safety due to impairment in cognition and functional status. and Physical Therapy Services are needed to assess and treat the following functional impairments: gait instability, left hip pain, deconditioning.    Further, I certify that my clinical findings support that this patient is homebound (i.e. absences from home require considerable and taxing effort and are for medical reasons or [employer] services or infrequently or of short duration when for other reasons) because: Requires assistance of another person or specialized equipment to access medical services  because patient: Has prohibitive pain during ambulation., Is prone to wander/get lost without assistance., Is unable to exit home safely on own due to: dementia, gait instability., Is unable to operate assistive equipment on their own., Range of motion limitations prevents ability to exit home safely., and Requires supervision of another for safe transfer...    Based on the above findings. I certify that this patient is confined to the home and needs intermittent skilled nursing care, physical therapy and/or speech therapy.  The patient is under my care, and I have initiated the establishment of the plan of care.  This patient will be followed by a physician who will periodically review the plan of care.  Physician/Provider to provide follow up care: Jeffrey Thapa    Responsible Medicare certified [place] Physician: Dr.Sara Andressa MD, signing F2F and only signing for initial order. Please send all follow up questions and concerns or needed follow up signatures to the PCP, who [place] has on file as:  Jeffrey Thapa.  Physician Signature: See electronic signature associated with these discharge orders.  Date: 12/15/2023      Rachel Weiss   1950    Orders 2023:   Hold Trulicity until further orders  Please update NP with blood sugars in 2 weeks  Labs next week: BMP, digoxin level (CHF, afib)  Trazodone 25 mg po HS for anxiety, insomnia  Discussed with   Sent to pharmacy        SHALONDA Kincaid CNP on 12/15/2023 at 12:29 PM      Sincerely,        SHALONDA Kincaid CNP

## 2023-12-14 NOTE — PROGRESS NOTES
[facility] GERIATRICS    PRIMARY CARE PROVIDER AND CLINIC:  Jeffrey Thapa, SHALONDA CNP, [address] [facility] [address]  Chief Complaint   Patient presents with    Hospital F/U      [place] Medical Record Number:  [id number]  Place of Service where encounter took place:  [facility] ([facility]) [[id number]]    Rachel Weiss  is a 73 year old  (1950), admitted to the above facility from  [facility] . Hospital stay 11/28/23 through 12/5/23.    HPI:    She admitted to Memory Care at this facility 9/26/2023 for a higher level of care following hospitalization and tcu stay.   Medical history significant for vascular dementia, HFrEF, afib, CAD with STEMI and stent to LAD 11/2021, ischemic cardiomyopathy, PVD, lymphedema, diabetes type 2, CKD stage 3, HTN, asthma, rheumatoid arthritis, depression, anxiety.   She was hospitalized at [facility] ([place] 9/6-9/13/2023 with acute on chronic CHF. She was found to be in afib with RVR. Cardiology consulted. She was diuresed with lasix IV and discharged on higher dose of torsemide (increased from 60 mg daily to 60 mg in am and 40 mg in pm). Empagliflozin was started. Digoxin dose was increased and metoprolol continued for afib. She was diuresed for 14 lbs with weight 181 lbs at discharge. Estimated dry weight ~182 lbs. She discharged to [facility] TCU where she made fair progress in therapies. Torsemide was decreased to 40 mg bid and losartan decreased to 12.5 mg daily for hypotension and weight 177 lbs. She received cipro for UTI.      She was sent to the [facility] ED 11/28/2023 with acute onset of severe left hip pain. CT showed inflammation of the left iliopsoas muscle. She was unable to tolerate MRI. Ortho was concerned for occult fracture and plan was for MRI under general anesthesia, which was cancelled by Anesthesia due to potential risk. Pain spontaneously resolved and she was able to participate in therapies. Losartan was increased back  "to 25 mg daily.     She is a poor historian. Pleasant and talkative, conversation appropriate. Denies pain of any type. Feeling well with good appetite. Reports edema of her legs is \"pretty good.\" Wheelchair bound, able to stand for transfers. Requires assist of 1 with cares.   Spoke with her  Madhu, who is concerned about her declining mobility and requests resuming home therapies. He also reports increased sundowning and anxiety during the night.     CODE STATUS/ADVANCE DIRECTIVES DISCUSSION:  No CPR- Do NOT Intubate    ALLERGIES:   Allergies   Allergen Reactions    Shellfish Allergy Shortness Of Breath, Anaphylaxis and Hives    Shellfish-Derived Products Shortness Of Breath, Anaphylaxis and Hives    Cefuroxime Other (See Comments)     Odd throat sensations - mucus, trouble swallowing (10/2013)    Celecoxib GI Disturbance    Levofloxacin Dizziness, Diarrhea and GI Disturbance     And arthralgia    Amoxicillin Unknown    Azithromycin Hives    Citalopram Other (See Comments)     Flushing    Doxycycline GI Disturbance     Bloating    Lodine [Etodolac] Unknown    Nitrofurantoin Other (See Comments)     Paresthesias    Penicillins Hives and Rash    Sulfa Antibiotics Hives     Takes Methotrexate    Sulfamethoxazole-Trimethoprim Rash    Sulindac Swelling     Swelling in hands plus gas, bloating, and loss of appetite    Valacyclovir Diarrhea      PAST MEDICAL HISTORY:   Past Medical History:   Diagnosis Date    A-fib (H)     Arthritis     CAD (coronary artery disease)     CKD (chronic kidney disease) stage 3, GFR 30-59 ml/min (H)     Depression with anxiety     Diabetes mellitus, type 2 (H)     Diverticulitis     perforation 10/2013    HFrEF (heart failure with reduced ejection fraction) (H)     HTN (hypertension)     Ischemic cardiomyopathy     Lymphedema     NSTEMI (non-ST elevated myocardial infarction) (H) 11/2021    stent to LAD    PVD (peripheral vascular disease) (H24)     RA (rheumatoid arthritis) (H)     " Severe tricuspid regurgitation     Shingles 2006    Umbilical hernia without obstruction and without gangrene     Uncomplicated asthma     Vascular dementia (H)       PAST SURGICAL HISTORY:   has a past surgical history that includes tonsillectomy; Cataract Extraction (Bilateral); Stent; and Cholecystectomy (04/2023).  FAMILY HISTORY: family history includes Cancer in her brother; Hypertension in her brother and mother; Throat cancer in her father.  SOCIAL HISTORY:   reports that she has never smoked. She has never used smokeless tobacco. She reports that she does not currently use alcohol. She reports that she does not use drugs.  Patient's living condition: lives in an assisted living facility    Post Discharge Medication Reconciliation Status:   MED REC REQUIRED  Post Medication Reconciliation Status: discharge medications reconciled and changed, per note/orders       Current Outpatient Medications   Medication Sig    acetaminophen (TYLENOL) 325 MG tablet Take 2 tablets (650 mg) by mouth every 4 hours as needed for mild pain    apixaban ANTICOAGULANT (ELIQUIS) 5 MG tablet Take 5 mg by mouth 2 times daily    digoxin (LANOXIN) 125 MCG tablet Take 1 tablet (125 mcg) by mouth daily    dimethicone-zinc oxide (ANSLEY PROTECT) external cream Apply topically daily    dulaglutide (TRULICITY) 0.75 MG/0.5ML pen Inject 0.75 mg Subcutaneous every 7 days    empagliflozin (JARDIANCE) 10 MG TABS tablet Take 1 tablet (10 mg) by mouth daily    folic acid (FOLVITE) 400 MCG tablet Take 1 tablet (400 mcg) by mouth 2 times daily    losartan (COZAAR) 25 MG tablet Take 25 mg by mouth daily    metFORMIN (GLUCOPHAGE) 500 MG tablet Take 2 tablets (1,000 mg) by mouth 2 times daily (with meals)    metoprolol succinate ER (TOPROL XL) 25 MG 24 hr tablet Take 1 tablet (25 mg) by mouth daily    multivitamin, therapeutic (THERA-VIT) TABS tablet Take 1 tablet by mouth daily    nitroGLYcerin (NITROSTAT) 0.4 MG sublingual tablet Place 0.4 mg under the  tongue every 5 minutes as needed for chest pain For chest pain place 1 tablet under the tongue every 5 minutes for 3 doses. If symptoms persist 5 minutes after 1st dose call 911.    potassium chloride ER (K-TAB) 20 MEQ CR tablet Take 1 tablet (20 mEq) by mouth 2 times daily    rosuvastatin (CRESTOR) 10 MG tablet Take 1 tablet (10 mg) by mouth daily    sertraline (ZOLOFT) 50 MG tablet Take 1 tablet (50 mg) by mouth daily    spironolactone (ALDACTONE) 25 MG tablet Take 0.5 tablets (12.5 mg) by mouth daily    torsemide (DEMADEX) 20 MG tablet Take 2 tablets (40 mg) by mouth 2 times daily    vitamin D3 (CHOLECALCIFEROL) 50 mcg (2000 units) tablet Take 1 tablet (50 mcg) by mouth daily     No current facility-administered medications for this visit.       ROS:  Limited due to dementia. Positives as noted under HPI    Vitals:  BP (!) 156/85   Pulse 82   Temp 98.2  F (36.8  C)   Resp 20   Wt 76.2 kg (167 lb 14.4 oz)   BMI 28.82 kg/m    Exam:  GENERAL APPEARANCE:  Alert, in no distress  ENT:  [place], oropharynx clear  EYES:  conjunctiva and lids normal  NECK:  no adenopathy, no thyromegaly  RESP:  lungs clear to auscultation   CV:  regular rate and rhythm, 1/6 murmur, compression wraps on both LE  ABDOMEN:  soft, non-tender, no distension, no masses  M/S:   wheelchair. DICKERSON. No joint inflammation. No tenderness to palpation left hip   SKIN:  no rashes or open areas  PSYCH:  oriented to self, situation, insight and judgement impaired, memory impaired , affect and mood normal    Lab/Diagnostic data:  Recent labs in [facility] reviewed by me today.       ASSESSMENT / PLAN:  (M25.672) Hip pain, left  (primary encounter diagnosis)  Comment: felt to be due to inflammation of left iliopsoas muscle. Pain resolved  Plan: tylenol prn. Home therapies     (F01.B0) Moderate vascular dementia without behavioral disturbance, psychotic disturbance, mood disturbance, or anxiety (H)  (F41.8) Depression with anxiety  Comment: moderate deficits, low  functional status. Worsening anxiety and poor sleep at night.   Sertraline was increased 11/21/2023  Plan: start trazodone 25 mg HS, continue sertraline 50 mg daily. Adjust doses as indicated. Memory Care staff assistance with cares, meals, activities, med admin   agrees with plan of care    (E11.22,  N18.31) Type 2 diabetes mellitus with stage 3a chronic kidney disease, without long-term current use of insulin (H)  Comment: blood glucose: 107-175, most readings are less than 150. HgbA1c 6.7 on 10/19/2023. Concern that she may be having episodes of hypoglycemia that could be contributing to fatigue and low functional status.   Plan: hold Trulicity X 2 weeks and review blood glucose. Continue metformin 1000 mg bid, Jardiance 10 mg daily. Monitor blood glucose     (I48.20) Chronic atrial fibrillation (H)  Comment: rate controlled: 80-82  Plan: continue apixaban 5 mg bid, metoprolol ER 25 mg daily, digoxin 125 mcg daily. Check digoxin level     (I50.20) HFrEF (heart failure with reduced ejection fraction) (H)  Comment: weight is down 13 lbs from admission to 167 lbs. Oral intake is good  Jardiance was started by Cardiology during 9/2023 hospitalization.   Plan: continue torsemide 40 mg bid, spironolactone 12.5 mg daily, digoxin 125 mcg daily, Jardiance 10 mg daily, losartan 25 mg daily, metoprolol ER 25 mg daily. Follow weight, symptoms. BMP, digoxin level next lab day.     (I89.0) Lymphedema  Comment: LE edema is down today   Plan: torsemide as above. Continue compression wraps. Elevate legs.     (I25.10) Coronary artery disease involving native coronary artery of native heart without angina pectoris  Comment: no chest pain or acute issues   Plan: cardiac meds as above. Continue statin, nitrostat prn    (I10) Primary hypertension  Comment: recent BPs: 156/85, 129/65  Plan: continue metoprolol ER 25 mg daily, losartan 25 mg daily, torsemide 40 mg bid. Monitor VS and adjust meds as indicated.     (R53.81)  Physical deconditioning  Comment: due to recent hospitalization and multiple comorbidities   Plan: refer to [facility] for PHYSICAL THERAPY/OT       Electronically signed by:  SHALONDA Kincaid CNP

## 2023-12-15 RX ORDER — TRAZODONE HYDROCHLORIDE 50 MG/1
25 TABLET, FILM COATED ORAL AT BEDTIME
Qty: 30 TABLET | Refills: 11 | Status: SHIPPED | OUTPATIENT
Start: 2023-12-15 | End: 2024-02-09

## 2023-12-15 NOTE — PROGRESS NOTES
Documentation of Face-to-Face and Certification for Home Health Services     Patient: Rachel Weiss   YOB: 1950  MR Number: [id number]  Today's Date: 12/15/2023    I certify that patient: Rachel Weiss is under my care and that I, or a nurse practitioner or physician's assistant working with me, had a face-to-face encounter that meets the physician face-to-face encounter requirements with this patient on: 12/14/2023.    This encounter with the patient was in whole, or in part, for the following medical condition, which is the primary reason for home health care: physical deconditioning, left hip pain.    I certify that, based on my findings, the following services are medically necessary home health services: Occupational Therapy and Physical Therapy.    My clinical findings support the need for the above services because: Occupational Therapy Services are needed to assess and treat cognitive ability and address ADL safety due to impairment in cognition and functional status. and Physical Therapy Services are needed to assess and treat the following functional impairments: gait instability, left hip pain, deconditioning.    Further, I certify that my clinical findings support that this patient is homebound (i.e. absences from home require considerable and taxing effort and are for medical reasons or [facility] services or infrequently or of short duration when for other reasons) because: Requires assistance of another person or specialized equipment to access medical services because patient: Has prohibitive pain during ambulation., Is prone to wander/get lost without assistance., Is unable to exit home safely on own due to: dementia, gait instability., Is unable to operate assistive equipment on their own., Range of motion limitations prevents ability to exit home safely., and Requires supervision of another for safe transfer...    Based on the above findings. I certify that this patient  is confined to the home and needs intermittent skilled nursing care, physical therapy and/or speech therapy.  The patient is under my care, and I have initiated the establishment of the plan of care.  This patient will be followed by a physician who will periodically review the plan of care.  Physician/Provider to provide follow up care: Jeffrey Thapa    Responsible Medicare certified PECOS Physician: Dr.Sara Andressa MD, signing F2F and only signing for initial order. Please send all follow up questions and concerns or needed follow up signatures to the PCP, who Center has on file as:  Jeffrey Thapa.  Physician Signature: See electronic signature associated with these discharge orders.  Date: 12/15/2023

## 2023-12-15 NOTE — PROGRESS NOTES
Rachel Weiss   1950    Orders 2023:   Hold Trulicity until further orders  Please update NP with blood sugars in 2 weeks  Labs next week: BMP, digoxin level (CHF, afib)  Trazodone 25 mg po HS for anxiety, insomnia  Discussed with   Sent to pharmacy        SHALONDA Kincaid CNP on 12/15/2023 at 12:29 PM

## 2023-12-20 ENCOUNTER — LAB REQUISITION (OUTPATIENT)
Dept: LAB | Facility: CLINIC | Age: 73
End: 2023-12-20
Payer: COMMERCIAL

## 2023-12-20 DIAGNOSIS — I48.0 PAROXYSMAL ATRIAL FIBRILLATION (H): ICD-10-CM

## 2023-12-21 PROCEDURE — P9603 ONE-WAY ALLOW PRORATED MILES: HCPCS | Mod: ORL | Performed by: NURSE PRACTITIONER

## 2023-12-21 PROCEDURE — 80048 BASIC METABOLIC PNL TOTAL CA: CPT | Mod: ORL | Performed by: NURSE PRACTITIONER

## 2023-12-21 PROCEDURE — 80162 ASSAY OF DIGOXIN TOTAL: CPT | Mod: ORL | Performed by: NURSE PRACTITIONER

## 2023-12-21 PROCEDURE — 36415 COLL VENOUS BLD VENIPUNCTURE: CPT | Mod: ORL | Performed by: NURSE PRACTITIONER

## 2023-12-22 LAB
ANION GAP SERPL CALCULATED.3IONS-SCNC: 18 MMOL/L (ref 7–15)
BUN SERPL-MCNC: 37.3 MG/DL (ref 8–23)
CALCIUM SERPL-MCNC: 9.6 MG/DL (ref 8.8–10.2)
CHLORIDE SERPL-SCNC: 94 MMOL/L (ref 98–107)
CREAT SERPL-MCNC: 1.09 MG/DL (ref 0.51–0.95)
DEPRECATED HCO3 PLAS-SCNC: 22 MMOL/L (ref 22–29)
DIGOXIN SERPL-MCNC: 1.2 NG/ML (ref 0.6–2)
EGFRCR SERPLBLD CKD-EPI 2021: 53 ML/MIN/1.73M2
GLUCOSE SERPL-MCNC: 97 MG/DL (ref 70–99)
POTASSIUM SERPL-SCNC: 4.3 MMOL/L (ref 3.4–5.3)
SODIUM SERPL-SCNC: 134 MMOL/L (ref 135–145)

## 2023-12-27 ENCOUNTER — TELEPHONE (OUTPATIENT)
Dept: GERIATRICS | Facility: CLINIC | Age: 73
End: 2023-12-27
Payer: COMMERCIAL

## 2023-12-27 DIAGNOSIS — B37.2 YEAST INFECTION OF THE SKIN: Primary | ICD-10-CM

## 2023-12-27 RX ORDER — NYSTATIN 100000 U/G
CREAM TOPICAL 2 TIMES DAILY
Qty: 30 G | Refills: 11 | Status: SHIPPED | OUTPATIENT
Start: 2023-12-27 | End: 2024-04-26

## 2023-12-27 NOTE — TELEPHONE ENCOUNTER
Rachel Weiss   1950    Orders 2023:  Nystatin cream bid to reddened groin, buttocks and qiana area for yeast infection  Sent to pharmacy      SHALONDA Kincaid CNP on 2023 at 2:54 PM

## 2024-01-03 ENCOUNTER — TELEPHONE (OUTPATIENT)
Dept: GERIATRICS | Facility: CLINIC | Age: 74
End: 2024-01-03
Payer: COMMERCIAL

## 2024-01-03 NOTE — TELEPHONE ENCOUNTER
[facility] Geriatrics Triage Nurse Telephone Encounter    Provider: SHALONDA Suero CNP   Facility: [facility]  Facility Type:  AL    Caller: Ben  Call Back Number: [phone]    Allergies:    Allergies   Allergen Reactions    Shellfish Allergy Shortness Of Breath, Anaphylaxis and Hives    Shellfish-Derived Products Shortness Of Breath, Anaphylaxis and Hives    Cefuroxime Other (See Comments)     Odd throat sensations - mucus, trouble swallowing (10/2013)    Celecoxib GI Disturbance    Levofloxacin Dizziness, Diarrhea and GI Disturbance     And arthralgia    Amoxicillin Unknown    Azithromycin Hives    Citalopram Other (See Comments)     Flushing    Doxycycline GI Disturbance     Bloating    Lodine [Etodolac] Unknown    Nitrofurantoin Other (See Comments)     Paresthesias    Penicillins Hives and Rash    Sulfa Antibiotics Hives     Takes Methotrexate    Sulfamethoxazole-Trimethoprim Rash    Sulindac Swelling     Swelling in hands plus gas, bloating, and loss of appetite    Valacyclovir Diarrhea        Reason for call: Pt has shown an increase in confusion and behaviors with staff over the past 48 hours. Spouse reported to nursing that pt is urinating more frequently as well. No other symptoms at this time; hx of dementia.    Verbal Order/Direction given by Provider: Encourage fluids and continue to monitor. Report if any s/s of UTI develop    Provider giving Order:  Aylin Oliva MD    Verbal Order given to: Ben Dawn RN

## 2024-01-05 ENCOUNTER — TELEPHONE (OUTPATIENT)
Dept: GERIATRICS | Facility: CLINIC | Age: 74
End: 2024-01-05
Payer: COMMERCIAL

## 2024-01-05 NOTE — TELEPHONE ENCOUNTER
[facility] Geriatrics Triage Nurse Telephone Encounter    Provider: SHALONDA Suero CNP   Facility: [facility]  Facility Type:  [place]    Caller: Barbra  Call Back Number: [phone]    Allergies:    Allergies   Allergen Reactions    Shellfish Allergy Shortness Of Breath, Anaphylaxis and Hives    Shellfish-Derived Products Shortness Of Breath, Anaphylaxis and Hives    Cefuroxime Other (See Comments)     Odd throat sensations - mucus, trouble swallowing (10/2013)    Celecoxib GI Disturbance    Levofloxacin Dizziness, Diarrhea and GI Disturbance     And arthralgia    Amoxicillin Unknown    Azithromycin Hives    Citalopram Other (See Comments)     Flushing    Doxycycline GI Disturbance     Bloating    Lodine [Etodolac] Unknown    Nitrofurantoin Other (See Comments)     Paresthesias    Penicillins Hives and Rash    Sulfa Antibiotics Hives     Takes Methotrexate    Sulfamethoxazole-Trimethoprim Rash    Sulindac Swelling     Swelling in hands plus gas, bloating, and loss of appetite    Valacyclovir Diarrhea        Reason for call: Pt continues to have increased confusion and urinary frequency. Family is requesting to check a UA/UC to r/o UTI.    Verbal Order/Direction given by Provider:   - Clean catch UA/UC for confusion and dysuria    Provider giving Order:  Aylin Oliva MD    Verbal Order given to: Barbra Modi RN

## 2024-01-07 ENCOUNTER — LAB REQUISITION (OUTPATIENT)
Dept: LAB | Facility: CLINIC | Age: 74
End: 2024-01-07
Payer: COMMERCIAL

## 2024-01-07 DIAGNOSIS — N39.0 URINARY TRACT INFECTION, SITE NOT SPECIFIED: ICD-10-CM

## 2024-01-07 LAB
ALBUMIN UR-MCNC: NEGATIVE MG/DL
APPEARANCE UR: ABNORMAL
BACTERIA #/AREA URNS HPF: ABNORMAL /HPF
BILIRUB UR QL STRIP: NEGATIVE
COLOR UR AUTO: ABNORMAL
GLUCOSE UR STRIP-MCNC: 500 MG/DL
HGB UR QL STRIP: ABNORMAL
HYALINE CASTS: 1 /LPF
KETONES UR STRIP-MCNC: NEGATIVE MG/DL
LEUKOCYTE ESTERASE UR QL STRIP: ABNORMAL
MUCOUS THREADS #/AREA URNS LPF: PRESENT /LPF
NITRATE UR QL: NEGATIVE
PH UR STRIP: 6.5 [PH] (ref 5–7)
RBC URINE: 1 /HPF
SP GR UR STRIP: 1 (ref 1–1.03)
SQUAMOUS EPITHELIAL: 1 /HPF
TRANSITIONAL EPI: <1 /HPF
UROBILINOGEN UR STRIP-MCNC: NORMAL MG/DL
WBC CLUMPS #/AREA URNS HPF: PRESENT /HPF
WBC URINE: >182 /HPF

## 2024-01-07 PROCEDURE — 87086 URINE CULTURE/COLONY COUNT: CPT | Mod: ORL | Performed by: NURSE PRACTITIONER

## 2024-01-07 PROCEDURE — 81001 URINALYSIS AUTO W/SCOPE: CPT | Mod: ORL | Performed by: NURSE PRACTITIONER

## 2024-01-07 PROCEDURE — 87186 SC STD MICRODIL/AGAR DIL: CPT | Mod: ORL | Performed by: NURSE PRACTITIONER

## 2024-01-08 ENCOUNTER — TELEPHONE (OUTPATIENT)
Dept: GERIATRICS | Facility: CLINIC | Age: 74
End: 2024-01-08
Payer: COMMERCIAL

## 2024-01-08 DIAGNOSIS — N39.0 URINARY TRACT INFECTION WITHOUT HEMATURIA, SITE UNSPECIFIED: Primary | ICD-10-CM

## 2024-01-08 RX ORDER — CIPROFLOXACIN 500 MG/1
500 TABLET, FILM COATED ORAL 2 TIMES DAILY
Qty: 10 TABLET | Refills: 0 | Status: SHIPPED | OUTPATIENT
Start: 2024-01-08 | End: 2024-01-13

## 2024-01-08 NOTE — TELEPHONE ENCOUNTER
Rachel Weiss   1950    Orders 2024:   1. Cipro 500 mg po bid X 5 days for UTI      SHALONDA Kincaid CNP on 2024 at 1:51 PM

## 2024-01-09 ENCOUNTER — ASSISTED LIVING VISIT (OUTPATIENT)
Dept: GERIATRICS | Facility: CLINIC | Age: 74
End: 2024-01-09
Payer: COMMERCIAL

## 2024-01-09 VITALS
RESPIRATION RATE: 20 BRPM | WEIGHT: 170 LBS | DIASTOLIC BLOOD PRESSURE: 56 MMHG | SYSTOLIC BLOOD PRESSURE: 115 MMHG | TEMPERATURE: 98.2 F | BODY MASS INDEX: 29.18 KG/M2 | HEART RATE: 79 BPM

## 2024-01-09 DIAGNOSIS — N18.31 TYPE 2 DIABETES MELLITUS WITH STAGE 3A CHRONIC KIDNEY DISEASE, WITHOUT LONG-TERM CURRENT USE OF INSULIN (H): ICD-10-CM

## 2024-01-09 DIAGNOSIS — I89.0 LYMPHEDEMA: ICD-10-CM

## 2024-01-09 DIAGNOSIS — E11.22 TYPE 2 DIABETES MELLITUS WITH STAGE 3A CHRONIC KIDNEY DISEASE, WITHOUT LONG-TERM CURRENT USE OF INSULIN (H): ICD-10-CM

## 2024-01-09 DIAGNOSIS — I50.20 HFREF (HEART FAILURE WITH REDUCED EJECTION FRACTION) (H): ICD-10-CM

## 2024-01-09 DIAGNOSIS — F41.8 DEPRESSION WITH ANXIETY: ICD-10-CM

## 2024-01-09 DIAGNOSIS — F01.B0 MODERATE VASCULAR DEMENTIA WITHOUT BEHAVIORAL DISTURBANCE, PSYCHOTIC DISTURBANCE, MOOD DISTURBANCE, OR ANXIETY (H): ICD-10-CM

## 2024-01-09 DIAGNOSIS — N39.0 URINARY TRACT INFECTION WITHOUT HEMATURIA, SITE UNSPECIFIED: Primary | ICD-10-CM

## 2024-01-09 DIAGNOSIS — I48.20 CHRONIC ATRIAL FIBRILLATION (H): ICD-10-CM

## 2024-01-09 LAB — BACTERIA UR CULT: ABNORMAL

## 2024-01-09 PROCEDURE — 99350 HOME/RES VST EST HIGH MDM 60: CPT | Performed by: NURSE PRACTITIONER

## 2024-01-09 NOTE — PROGRESS NOTES
[facility] GERIATRICS    Chief Complaint   Patient presents with    RECHECK     HPI:  Rachel Weiss is a 73 year old  (1950), who is being seen today for an episodic care visit at: [facility] [[id number]].   She admitted to Memory Care at this facility 9/26/2023 for a higher level of care following hospitalization and tcu stay.   Medical history significant for vascular dementia, HFrEF, afib, CAD with STEMI and stent to LAD 11/2021, ischemic cardiomyopathy, PVD, lymphedema, diabetes type 2, CKD stage 3, HTN, asthma, rheumatoid arthritis, depression, anxiety.   She was hospitalized at [facility] 9/6-9/13/2023 with acute on chronic CHF. She was found to be in afib with RVR. Cardiology consulted. She was diuresed with lasix IV and discharged on higher dose of torsemide (increased from 60 mg daily to 60 mg in am and 40 mg in pm). Empagliflozin was started. Digoxin dose was increased and metoprolol continued for afib. She was diuresed for 14 lbs with weight 181 lbs at discharge. Estimated dry weight ~182 lbs. She discharged to [facility] TCU where she made fair progress in therapies. Torsemide was decreased to 40 mg bid and losartan decreased to 12.5 mg daily for hypotension and weight 177 lbs. She received cipro for UTI.   She was sent to the [facility] ED 11/28/2023 with acute onset of severe left hip pain. CT showed inflammation of the left iliopsoas muscle. She was unable to tolerate MRI. Ortho was concerned for occult fracture and plan was for MRI under general anesthesia, which was cancelled by Anesthesia due to potential risk. Pain spontaneously resolved and she was able to participate in therapies. Losartan was increased back to 25 mg daily. She returned to the facility 12/5/2023.       Today's concern is:      Urinary tract infection without hematuria, site unspecified  Moderate vascular dementia without behavioral disturbance, psychotic disturbance, mood disturbance, or anxiety  (H)  Depression with anxiety  Type 2 diabetes mellitus with stage 3a chronic kidney disease, without long-term current use of insulin (H)  Chronic atrial fibrillation (H)  HFrEF (heart failure with reduced ejection fraction) (H)  Lymphedema  She is seen today to  follow up on increased confusion and UTI. UC grew >100,000 E coli and Cipro was started yesterday.   She is pleasant and talkative, though mostly nonsensical. Denies feeling ill. She is usually more conversant. Staff reports she's been more confused for several days. No falls or head strikes and functional status is at baseline per staff. Wheelchair bound, stands for transfers. Requires assist of 1 with cares.   Spoke with her  Madhu, who reports she has been more confused for the past week. He questions why a UA/UC wasn't checked sooner.     Allergies, and PMH/PSH reviewed in [facility] today    REVIEW OF SYSTEMS:  Unable to obtain due to dementia. Positives as noted under HPI.       Objective:   /56   Pulse 79   Temp 98.2  F (36.8  C)   Resp 20   Wt 77.1 kg (170 lb)   BMI 29.18 kg/m    GENERAL APPEARANCE:  Alert, in no distress  ENT:  [place], oropharynx clear  EYES:  conjunctiva and lids normal  NECK:  no adenopathy, no thyromegaly  RESP:  lungs clear to auscultation, no crackles or wheezes   CV:  regular rate and rhythm, 1/6 murmur, compression wraps on both LE  ABDOMEN:  soft, non-tender, no distension   M/S:   wheelchair. DICKERSON with good strength. No joint inflammation   SKIN:  no rashes or open areas  PSYCH:  oriented to self, situation, insight and judgement impaired, memory impaired , affect and mood normal    Recent labs in [facility] reviewed by me today.       ASSESSMENT / PLAN:  (N39.0) Urinary tract infection without hematuria, site unspecified  (primary encounter diagnosis)  Comment: UC grew E coli and cipro started last night.  Plan: cipro for 5 days. If she has recurrent UTI, will need to discontinue Jardiance    agrees with plan of  care  Discussed with staff     (F01.B0) Moderate vascular dementia without behavioral disturbance, psychotic disturbance, mood disturbance, or anxiety (H)  (F41.8) Depression with anxiety  Comment: increased confusion most likely due to UTI. No signs of other illness or acute neurological event.   Plan: CBC, BMP. Continue trazodone (started 12/2023-considering stopping if confusion doesn't clear with treatment of UTI), sertraline. Memory Care staff assistance with cares, meals, activities, med admin    (E11.22,  N18.31) Type 2 diabetes mellitus with stage 3a chronic kidney disease, without long-term current use of insulin (H)  Comment: blood glucose: 117-194, outlier 233  Trulicity has been on hold due to concerns of hypoglycemia  Plan: continue metformin, Jardiance. Monitor blood glucose. Resume Trulicity if indicated     (I48.20) Chronic atrial fibrillation (H)  Comment: rate controlled  Digoxin level 1.2 on 12/21/2023  Plan: continue apixaban, metoprolol, digoxin     (I50.20) HFrEF (heart failure with reduced ejection fraction) (H)  (I89.0) Lymphedema  Comment: appears euvolemic   Jardiance was started by Cardiology 9/2023-will need to discontinue if recurrent UTI  Plan: continue torsemide, spironolactone, Jardiance, losartan, metoprolol. Follow weight, symptoms      Electronically signed by: SHALONDA Kincaid CNP

## 2024-01-09 NOTE — LETTER
1/9/2024        RE: Rachel Weiss  Psychiatric   [address] [facility] [address]        [facility] GERIATRICS    Chief Complaint   Patient presents with     RECHECK     HPI:  Rachel Weiss is a 73 year old  (1950), who is being seen today for an episodic care visit at: [facility] ([facility]) [[id number]].   She admitted to Memory Care at this facility 9/26/2023 for a higher level of care following hospitalization and tcu stay.   Medical history significant for vascular dementia, HFrEF, afib, CAD with STEMI and stent to LAD 11/2021, ischemic cardiomyopathy, PVD, lymphedema, diabetes type 2, CKD stage 3, HTN, asthma, rheumatoid arthritis, depression, anxiety.   She was hospitalized at [facility] 9/6-9/13/2023 with acute on chronic CHF. She was found to be in afib with RVR. Cardiology consulted. She was diuresed with lasix IV and discharged on higher dose of torsemide (increased from 60 mg daily to 60 mg in am and 40 mg in pm). Empagliflozin was started. Digoxin dose was increased and metoprolol continued for afib. She was diuresed for 14 lbs with weight 181 lbs at discharge. Estimated dry weight ~182 lbs. She discharged to [facility] TCU where she made fair progress in therapies. Torsemide was decreased to 40 mg bid and losartan decreased to 12.5 mg daily for hypotension and weight 177 lbs. She received cipro for UTI.   She was sent to the [facility] ED 11/28/2023 with acute onset of severe left hip pain. CT showed inflammation of the left iliopsoas muscle. She was unable to tolerate MRI. Ortho was concerned for occult fracture and plan was for MRI under general anesthesia, which was cancelled by Anesthesia due to potential risk. Pain spontaneously resolved and she was able to participate in therapies. Losartan was increased back to 25 mg daily. She returned to the facility 12/5/2023.       Today's concern is:      Urinary tract infection without hematuria, site  unspecified  Moderate vascular dementia without behavioral disturbance, psychotic disturbance, mood disturbance, or anxiety (H)  Depression with anxiety  Type 2 diabetes mellitus with stage 3a chronic kidney disease, without long-term current use of insulin (H)  Chronic atrial fibrillation (H)  HFrEF (heart failure with reduced ejection fraction) (H)  Lymphedema  She is seen today to  follow up on increased confusion and UTI. UC grew >100,000 E coli and Cipro was started yesterday.   She is pleasant and talkative, though mostly nonsensical. Denies feeling ill. She is usually more conversant. Staff reports she's been more confused for several days. No falls or head strikes and functional status is at baseline per staff. Wheelchair bound, stands for transfers. Requires assist of 1 with cares.   Spoke with her  Madhu, who reports she has been more confused for the past week. He questions why a UA/UC wasn't checked sooner.     Allergies, and PMH/PSH reviewed in EPIC today    REVIEW OF SYSTEMS:  Unable to obtain due to dementia. Positives as noted under HPI.       Objective:   /56   Pulse 79   Temp 98.2  F (36.8  C)   Resp 20   Wt 77.1 kg (170 lb)   BMI 29.18 kg/m    GENERAL APPEARANCE:  Alert, in no distress  ENT:  [place], oropharynx clear  EYES:  conjunctiva and lids normal  NECK:  no adenopathy, no thyromegaly  RESP:  lungs clear to auscultation, no crackles or wheezes   CV:  regular rate and rhythm, 1/6 murmur, compression wraps on both LE  ABDOMEN:  soft, non-tender, no distension   M/S:   wheelchair. DICKERSON with good strength. No joint inflammation   SKIN:  no rashes or open areas  PSYCH:  oriented to self, situation, insight and judgement impaired, memory impaired , affect and mood normal    Recent labs in [facility] reviewed by me today.       ASSESSMENT / PLAN:  (N39.0) Urinary tract infection without hematuria, site unspecified  (primary encounter diagnosis)  Comment: UC grew E coli and cipro started last  night.  Plan: cipro for 5 days. If she has recurrent UTI, will need to discontinue Jardiance    agrees with plan of care  Discussed with staff     (F01.B0) Moderate vascular dementia without behavioral disturbance, psychotic disturbance, mood disturbance, or anxiety (H)  (F41.8) Depression with anxiety  Comment: increased confusion most likely due to UTI. No signs of other illness or acute neurological event.   Plan: CBC, BMP. Continue trazodone (started 12/2023-considering stopping if confusion doesn't clear with treatment of UTI), sertraline. Memory Care staff assistance with cares, meals, activities, med admin    (E11.22,  N18.31) Type 2 diabetes mellitus with stage 3a chronic kidney disease, without long-term current use of insulin (H)  Comment: blood glucose: 117-194, outlier 233  Trulicity has been on hold due to concerns of hypoglycemia  Plan: continue metformin, Jardiance. Monitor blood glucose. Resume Trulicity if indicated     (I48.20) Chronic atrial fibrillation (H)  Comment: rate controlled  Digoxin level 1.2 on 12/21/2023  Plan: continue apixaban, metoprolol, digoxin     (I50.20) HFrEF (heart failure with reduced ejection fraction) (H)  (I89.0) Lymphedema  Comment: appears euvolemic   Jardiance was started by Cardiology 9/2023-will need to discontinue if recurrent UTI  Plan: continue torsemide, spironolactone, Jardiance, losartan, metoprolol. Follow weight, symptoms      Electronically signed by: SHALONDA Kincaid CNP           Sincerely,        SHALONDA Kincaid CNP

## 2024-01-10 ENCOUNTER — LAB REQUISITION (OUTPATIENT)
Dept: LAB | Facility: CLINIC | Age: 74
End: 2024-01-10
Payer: COMMERCIAL

## 2024-01-10 DIAGNOSIS — E87.1 HYPO-OSMOLALITY AND HYPONATREMIA: ICD-10-CM

## 2024-01-10 DIAGNOSIS — N39.0 URINARY TRACT INFECTION, SITE NOT SPECIFIED: ICD-10-CM

## 2024-01-11 LAB
BASOPHILS # BLD AUTO: 0 10E3/UL (ref 0–0.2)
BASOPHILS NFR BLD AUTO: 0 %
EOSINOPHIL # BLD AUTO: 0.1 10E3/UL (ref 0–0.7)
EOSINOPHIL NFR BLD AUTO: 1 %
ERYTHROCYTE [DISTWIDTH] IN BLOOD BY AUTOMATED COUNT: 18.3 % (ref 10–15)
HCT VFR BLD AUTO: 40.9 % (ref 35–47)
HGB BLD-MCNC: 13.1 G/DL (ref 11.7–15.7)
IMM GRANULOCYTES # BLD: 0 10E3/UL
IMM GRANULOCYTES NFR BLD: 0 %
LYMPHOCYTES # BLD AUTO: 1.3 10E3/UL (ref 0.8–5.3)
LYMPHOCYTES NFR BLD AUTO: 18 %
MCH RBC QN AUTO: 30 PG (ref 26.5–33)
MCHC RBC AUTO-ENTMCNC: 32 G/DL (ref 31.5–36.5)
MCV RBC AUTO: 94 FL (ref 78–100)
MONOCYTES # BLD AUTO: 0.9 10E3/UL (ref 0–1.3)
MONOCYTES NFR BLD AUTO: 12 %
NEUTROPHILS # BLD AUTO: 4.9 10E3/UL (ref 1.6–8.3)
NEUTROPHILS NFR BLD AUTO: 69 %
NRBC # BLD AUTO: 0 10E3/UL
NRBC BLD AUTO-RTO: 0 /100
PLATELET # BLD AUTO: 267 10E3/UL (ref 150–450)
RBC # BLD AUTO: 4.36 10E6/UL (ref 3.8–5.2)
WBC # BLD AUTO: 7.2 10E3/UL (ref 4–11)

## 2024-01-11 PROCEDURE — 36415 COLL VENOUS BLD VENIPUNCTURE: CPT | Mod: ORL | Performed by: NURSE PRACTITIONER

## 2024-01-11 PROCEDURE — 80048 BASIC METABOLIC PNL TOTAL CA: CPT | Mod: ORL | Performed by: NURSE PRACTITIONER

## 2024-01-11 PROCEDURE — 85025 COMPLETE CBC W/AUTO DIFF WBC: CPT | Mod: ORL | Performed by: NURSE PRACTITIONER

## 2024-01-11 PROCEDURE — P9604 ONE-WAY ALLOW PRORATED TRIP: HCPCS | Mod: ORL | Performed by: NURSE PRACTITIONER

## 2024-01-12 LAB
ANION GAP SERPL CALCULATED.3IONS-SCNC: 11 MMOL/L (ref 7–15)
BUN SERPL-MCNC: 20.9 MG/DL (ref 8–23)
CALCIUM SERPL-MCNC: 9.3 MG/DL (ref 8.8–10.2)
CHLORIDE SERPL-SCNC: 94 MMOL/L (ref 98–107)
CREAT SERPL-MCNC: 1.14 MG/DL (ref 0.51–0.95)
DEPRECATED HCO3 PLAS-SCNC: 31 MMOL/L (ref 22–29)
EGFRCR SERPLBLD CKD-EPI 2021: 50 ML/MIN/1.73M2
GLUCOSE SERPL-MCNC: 101 MG/DL (ref 70–99)
POTASSIUM SERPL-SCNC: 4.2 MMOL/L (ref 3.4–5.3)
SODIUM SERPL-SCNC: 136 MMOL/L (ref 135–145)

## 2024-01-15 DIAGNOSIS — I10 PRIMARY HYPERTENSION: ICD-10-CM

## 2024-01-15 DIAGNOSIS — I48.20 CHRONIC ATRIAL FIBRILLATION (H): Primary | ICD-10-CM

## 2024-01-15 DIAGNOSIS — F41.8 DEPRESSION WITH ANXIETY: ICD-10-CM

## 2024-01-15 DIAGNOSIS — I48.20 CHRONIC ATRIAL FIBRILLATION (H): ICD-10-CM

## 2024-01-15 RX ORDER — LOSARTAN POTASSIUM 25 MG/1
25 TABLET ORAL DAILY
Qty: 90 TABLET | Refills: 97 | Status: SHIPPED | OUTPATIENT
Start: 2024-01-15 | End: 2024-02-09

## 2024-01-15 RX ORDER — APIXABAN 5 MG/1
TABLET, FILM COATED ORAL
Qty: 54 TABLET | Refills: 97 | Status: SHIPPED | OUTPATIENT
Start: 2024-01-15 | End: 2024-01-15

## 2024-01-15 RX ORDER — APIXABAN 5 MG/1
TABLET, FILM COATED ORAL
Qty: 180 TABLET | Refills: 97 | Status: SHIPPED | OUTPATIENT
Start: 2024-01-15

## 2024-01-18 ENCOUNTER — LAB REQUISITION (OUTPATIENT)
Dept: LAB | Facility: CLINIC | Age: 74
End: 2024-01-18
Payer: COMMERCIAL

## 2024-01-18 DIAGNOSIS — N39.0 URINARY TRACT INFECTION, SITE NOT SPECIFIED: ICD-10-CM

## 2024-01-19 ENCOUNTER — LAB REQUISITION (OUTPATIENT)
Dept: LAB | Facility: CLINIC | Age: 74
End: 2024-01-19
Payer: COMMERCIAL

## 2024-01-19 DIAGNOSIS — N39.0 URINARY TRACT INFECTION, SITE NOT SPECIFIED: ICD-10-CM

## 2024-01-19 LAB
ALBUMIN UR-MCNC: NEGATIVE MG/DL
APPEARANCE UR: ABNORMAL
BACTERIA #/AREA URNS HPF: ABNORMAL /HPF
BILIRUB UR QL STRIP: NEGATIVE
COLOR UR AUTO: ABNORMAL
GLUCOSE UR STRIP-MCNC: 300 MG/DL
HGB UR QL STRIP: NEGATIVE
KETONES UR STRIP-MCNC: NEGATIVE MG/DL
LEUKOCYTE ESTERASE UR QL STRIP: ABNORMAL
NITRATE UR QL: NEGATIVE
PH UR STRIP: 6 [PH] (ref 5–7)
RBC URINE: 6 /HPF
SP GR UR STRIP: 1 (ref 1–1.03)
SQUAMOUS EPITHELIAL: 15 /HPF
TRANSITIONAL EPI: 1 /HPF
UROBILINOGEN UR STRIP-MCNC: NORMAL MG/DL
WBC CLUMPS #/AREA URNS HPF: PRESENT /HPF
WBC URINE: 36 /HPF
YEAST #/AREA URNS HPF: ABNORMAL /HPF

## 2024-01-19 PROCEDURE — 81001 URINALYSIS AUTO W/SCOPE: CPT | Mod: ORL | Performed by: NURSE PRACTITIONER

## 2024-01-19 PROCEDURE — 87086 URINE CULTURE/COLONY COUNT: CPT | Mod: ORL | Performed by: NURSE PRACTITIONER

## 2024-01-20 LAB — BACTERIA UR CULT: NORMAL

## 2024-01-21 ENCOUNTER — TELEPHONE (OUTPATIENT)
Dept: GERIATRICS | Facility: CLINIC | Age: 74
End: 2024-01-21
Payer: COMMERCIAL

## 2024-01-23 ENCOUNTER — ASSISTED LIVING VISIT (OUTPATIENT)
Dept: GERIATRICS | Facility: CLINIC | Age: 74
End: 2024-01-23
Payer: COMMERCIAL

## 2024-01-23 VITALS
HEART RATE: 105 BPM | TEMPERATURE: 99 F | SYSTOLIC BLOOD PRESSURE: 106 MMHG | BODY MASS INDEX: 28.77 KG/M2 | WEIGHT: 167.6 LBS | RESPIRATION RATE: 16 BRPM | DIASTOLIC BLOOD PRESSURE: 76 MMHG

## 2024-01-23 DIAGNOSIS — I89.0 LYMPHEDEMA: ICD-10-CM

## 2024-01-23 DIAGNOSIS — I48.20 CHRONIC ATRIAL FIBRILLATION (H): ICD-10-CM

## 2024-01-23 DIAGNOSIS — F41.8 DEPRESSION WITH ANXIETY: ICD-10-CM

## 2024-01-23 DIAGNOSIS — F01.B0 MODERATE VASCULAR DEMENTIA WITHOUT BEHAVIORAL DISTURBANCE, PSYCHOTIC DISTURBANCE, MOOD DISTURBANCE, OR ANXIETY (H): ICD-10-CM

## 2024-01-23 DIAGNOSIS — I50.20 HFREF (HEART FAILURE WITH REDUCED EJECTION FRACTION) (H): ICD-10-CM

## 2024-01-23 DIAGNOSIS — R50.9 LOW GRADE FEVER: Primary | ICD-10-CM

## 2024-01-23 PROCEDURE — 99349 HOME/RES VST EST MOD MDM 40: CPT | Performed by: NURSE PRACTITIONER

## 2024-01-23 RX ORDER — LORAZEPAM 0.5 MG/1
TABLET ORAL
Qty: 1 TABLET | Refills: 0 | Status: SHIPPED | OUTPATIENT
Start: 2024-01-23 | End: 2024-02-09

## 2024-01-23 NOTE — PROGRESS NOTES
[facility] GERIATRICS    Chief Complaint   Patient presents with    RECHECK     HPI:  Rachel Weiss is a 73 year old  (1950), who is being seen today for an episodic care visit at: [facility] ([facility]) [[id number]].   She admitted to Memory Care at this facility 9/26/2023 for a higher level of care following hospitalization and tcu stay.   Medical history significant for vascular dementia, HFrEF, afib, CAD with STEMI and stent to LAD 11/2021, ischemic cardiomyopathy, PVD, lymphedema, diabetes type 2, CKD stage 3, HTN, asthma, rheumatoid arthritis, depression, anxiety.   She was hospitalized at [facility] 9/6-9/13/2023 with acute on chronic CHF and was found to be in afib with RVR. Cardiology consulted. She was diuresed with lasix IV and discharged on  torsemide (increased from 60 mg daily to 60 mg in am and 40 mg in pm). Empagliflozin was started. Digoxin dose was increased and metoprolol continued for afib. She was diuresed for 14 lbs with weight 181 lbs at discharge. Estimated dry weight ~182 lbs. She discharged to [facility] TCU where she made fair progress in therapies. Torsemide was decreased to 40 mg bid and losartan decreased to 12.5 mg daily for hypotension and weight 177 lbs. She received cipro for UTI.   She was sent to the [facility] ED 11/28/2023 with acute onset of severe left hip pain. CT showed inflammation of the left iliopsoas muscle. She was unable to tolerate MRI. Ortho was concerned for occult fracture and plan was for MRI under general anesthesia, which was cancelled by Anesthesia due to potential risk. Pain spontaneously resolved and she was able to participate in therapies. Losartan was increased back to 25 mg daily. She returned to the facility 12/5/2023.       Today's concern is:      Low grade fever  Moderate vascular dementia without behavioral disturbance, psychotic disturbance, mood disturbance, or anxiety (H)  Depression with anxiety  Chronic atrial fibrillation (H)  HFrEF  (heart failure with reduced ejection fraction) (H)  Lymphedema  She is seen today to follow up on increased confusion for the past few weeks. She was treated for UTI earlier this month with no improvement in her cognition. She had a low grade fever of 99 over the weekend. Follow up UA/UC with mixed urogenital didier. She is pleasantly confused, conversation somewhat more appropriate today. Denies feeling ill. Good appetite. She had a fall 1/21/2024 without injury. Wheelchair bound and requires assist of 1 with cares.   Spoke with her  Madhu, who notes she is scheduled for dental extractions later this week. He has not noticed any signs of oral pain. He reports ongoing confusion and paranoia, as well as anger at times.       Allergies, and PMH/PSH reviewed in EPIC today    REVIEW OF SYSTEMS:  Unable to obtain due to dementia. Positives as noted under HPI.       Objective:   /76   Pulse 105   Temp 99  F (37.2  C)   Resp 16   Wt 76 kg (167 lb 9.6 oz)   BMI 28.77 kg/m    GENERAL APPEARANCE:  Alert, in no distress  ENT:  [place], oropharynx clear  EYES:  conjunctiva and lids normal  RESP:  diminished right base, left side clear, no crackles   CV:  irregular rate and rhythm, no murmur, peripheral edema 2+in both LE  ABDOMEN:  soft, non-tender, no distension  M/S:   wheelchair. DICKERSON with good strength. No joint inflammation   SKIN:  stasis changes both LE, no acute erythema, no open areas   PSYCH:  oriented to self, insight and judgement impaired, memory impaired , affect and mood normal    Recent labs in [facility] reviewed by me today.     ASSESSMENT / PLAN:  (R50.9) Low grade fever  (primary encounter diagnosis)  Comment: treated with cipro for UTI earlier this month. Follow up UA/UC not indicative of infection. CXR obtained today and is negative. Possibly related to dental infection.   Plan: CBC with diff, BMP. Dental appointment for extractions 1/26/2024 as scheduled-ativan 0.25 mg prior to the appointment.      (F01.B0) Moderate vascular dementia without behavioral disturbance, psychotic disturbance, mood disturbance, or anxiety (H)  (F41.8) Depression with anxiety  Comment: increased confusion with no s/s of acute illness or neurological event. Trazodone was started 12/2023 and may be contributing.   Plan: discontinue trazodone. Continue sertraline.Memory Care staff assistance with cares, meals, activities, med admin. Consider CT head-though  isn't sure she would be able to tolerate.   Discussed with staff.     (I48.20) Chronic atrial fibrillation (H)  Comment: rate controlled: 67-79  Plan: continue apixaban, metoprolol, digoxin    (I50.20) HFrEF (heart failure with reduced ejection fraction) (H)  (I89.0) Lymphedema  Comment: weight stable and LE edema unchanged   Plan: continue torsemide, spironolactone, losartan, metoprolol. Discussed discontinuing Jardiance if recurrent UTI with her -will continue for now and monitor           Electronically signed by: SHALONDA Kincaid CNP

## 2024-01-23 NOTE — LETTER
1/23/2024        RE: Rachel Rasheed [address] [address]        No notes on file      Sincerely,        SHALONDA Kincaid CNP

## 2024-01-24 ENCOUNTER — LAB REQUISITION (OUTPATIENT)
Dept: LAB | Facility: CLINIC | Age: 74
End: 2024-01-24
Payer: COMMERCIAL

## 2024-01-24 DIAGNOSIS — R41.82 ALTERED MENTAL STATUS, UNSPECIFIED: ICD-10-CM

## 2024-01-25 LAB
ANION GAP SERPL CALCULATED.3IONS-SCNC: 12 MMOL/L (ref 7–15)
BASOPHILS # BLD AUTO: 0 10E3/UL (ref 0–0.2)
BASOPHILS NFR BLD AUTO: 0 %
BUN SERPL-MCNC: 17.9 MG/DL (ref 8–23)
CALCIUM SERPL-MCNC: 8.9 MG/DL (ref 8.8–10.2)
CHLORIDE SERPL-SCNC: 93 MMOL/L (ref 98–107)
CREAT SERPL-MCNC: 0.89 MG/DL (ref 0.51–0.95)
DEPRECATED HCO3 PLAS-SCNC: 27 MMOL/L (ref 22–29)
EGFRCR SERPLBLD CKD-EPI 2021: 67 ML/MIN/1.73M2
EOSINOPHIL # BLD AUTO: 0.1 10E3/UL (ref 0–0.7)
EOSINOPHIL NFR BLD AUTO: 1 %
ERYTHROCYTE [DISTWIDTH] IN BLOOD BY AUTOMATED COUNT: 18.2 % (ref 10–15)
GLUCOSE SERPL-MCNC: 76 MG/DL (ref 70–99)
HCT VFR BLD AUTO: 37.2 % (ref 35–47)
HGB BLD-MCNC: 12 G/DL (ref 11.7–15.7)
IMM GRANULOCYTES # BLD: 0.1 10E3/UL
IMM GRANULOCYTES NFR BLD: 1 %
LYMPHOCYTES # BLD AUTO: 1.7 10E3/UL (ref 0.8–5.3)
LYMPHOCYTES NFR BLD AUTO: 22 %
MCH RBC QN AUTO: 29.6 PG (ref 26.5–33)
MCHC RBC AUTO-ENTMCNC: 32.3 G/DL (ref 31.5–36.5)
MCV RBC AUTO: 92 FL (ref 78–100)
MONOCYTES # BLD AUTO: 0.9 10E3/UL (ref 0–1.3)
MONOCYTES NFR BLD AUTO: 11 %
NEUTROPHILS # BLD AUTO: 5.1 10E3/UL (ref 1.6–8.3)
NEUTROPHILS NFR BLD AUTO: 65 %
NRBC # BLD AUTO: 0 10E3/UL
NRBC BLD AUTO-RTO: 0 /100
PLATELET # BLD AUTO: 286 10E3/UL (ref 150–450)
POTASSIUM SERPL-SCNC: 3.8 MMOL/L (ref 3.4–5.3)
RBC # BLD AUTO: 4.06 10E6/UL (ref 3.8–5.2)
SODIUM SERPL-SCNC: 132 MMOL/L (ref 135–145)
WBC # BLD AUTO: 7.8 10E3/UL (ref 4–11)

## 2024-01-25 PROCEDURE — 36415 COLL VENOUS BLD VENIPUNCTURE: CPT | Mod: ORL | Performed by: NURSE PRACTITIONER

## 2024-01-25 PROCEDURE — 80048 BASIC METABOLIC PNL TOTAL CA: CPT | Mod: ORL | Performed by: NURSE PRACTITIONER

## 2024-01-25 PROCEDURE — 85025 COMPLETE CBC W/AUTO DIFF WBC: CPT | Mod: ORL | Performed by: NURSE PRACTITIONER

## 2024-01-25 PROCEDURE — P9604 ONE-WAY ALLOW PRORATED TRIP: HCPCS | Mod: ORL | Performed by: NURSE PRACTITIONER

## 2024-02-08 ENCOUNTER — ASSISTED LIVING VISIT (OUTPATIENT)
Dept: GERIATRICS | Facility: CLINIC | Age: 74
End: 2024-02-08
Payer: COMMERCIAL

## 2024-02-08 DIAGNOSIS — U07.1 INFECTION DUE TO 2019 NOVEL CORONAVIRUS: Primary | ICD-10-CM

## 2024-02-08 DIAGNOSIS — I50.20 HFREF (HEART FAILURE WITH REDUCED EJECTION FRACTION) (H): ICD-10-CM

## 2024-02-08 DIAGNOSIS — I10 PRIMARY HYPERTENSION: ICD-10-CM

## 2024-02-08 DIAGNOSIS — N18.31 TYPE 2 DIABETES MELLITUS WITH STAGE 3A CHRONIC KIDNEY DISEASE, WITHOUT LONG-TERM CURRENT USE OF INSULIN (H): ICD-10-CM

## 2024-02-08 DIAGNOSIS — L03.116 CELLULITIS OF LEFT LOWER EXTREMITY: ICD-10-CM

## 2024-02-08 DIAGNOSIS — E11.22 TYPE 2 DIABETES MELLITUS WITH STAGE 3A CHRONIC KIDNEY DISEASE, WITHOUT LONG-TERM CURRENT USE OF INSULIN (H): ICD-10-CM

## 2024-02-08 DIAGNOSIS — I25.10 CORONARY ARTERY DISEASE INVOLVING NATIVE CORONARY ARTERY OF NATIVE HEART WITHOUT ANGINA PECTORIS: ICD-10-CM

## 2024-02-08 DIAGNOSIS — F41.8 DEPRESSION WITH ANXIETY: ICD-10-CM

## 2024-02-08 DIAGNOSIS — I48.20 CHRONIC ATRIAL FIBRILLATION (H): ICD-10-CM

## 2024-02-08 DIAGNOSIS — F01.B0 MODERATE VASCULAR DEMENTIA WITHOUT BEHAVIORAL DISTURBANCE, PSYCHOTIC DISTURBANCE, MOOD DISTURBANCE, OR ANXIETY (H): ICD-10-CM

## 2024-02-08 DIAGNOSIS — I89.0 LYMPHEDEMA: ICD-10-CM

## 2024-02-08 PROCEDURE — 99349 HOME/RES VST EST MOD MDM 40: CPT | Performed by: NURSE PRACTITIONER

## 2024-02-08 RX ORDER — LOSARTAN POTASSIUM 25 MG/1
12.5 TABLET ORAL DAILY
COMMUNITY
End: 2024-02-22

## 2024-02-08 NOTE — PROGRESS NOTES
[facility] GERIATRICS    PRIMARY CARE PROVIDER AND CLINIC:  Jeffrey Thapa, SHALONDA CNP, [address] [facility] [address]   Chief Complaint   Patient presents with    Hospital F/U      [place] Medical Record Number:  [id number]  Place of Service where encounter took place:  [facility] [place] ([facility]) [[id number]]    Rachel Weiss  is a 73 year old  (1950), returned to the above facility from  [facility] . Hospital stay 1/30/24 - 2/6/24.     HPI:    She admitted to Memory Care at this facility 9/26/2023 for a higher level of care following hospitalization and tcu stay.   Medical history significant for vascular dementia, HFrEF, afib, CAD with STEMI and stent to LAD 11/2021, ischemic cardiomyopathy, PVD, lymphedema, diabetes type 2, CKD stage 3, HTN, asthma, rheumatoid arthritis, depression, anxiety.   She was hospitalized at [facility] 9/6-9/13/2023 with acute on chronic CHF and was found to be in afib with RVR. Cardiology consulted. She was diuresed with lasix IV and discharged on  torsemide (increased from 60 mg daily to 60 mg in am and 40 mg in pm). Empagliflozin was started. Digoxin dose was increased and metoprolol continued for afib. She was diuresed for 14 lbs with weight 181 lbs at discharge. Estimated dry weight ~182 lbs. She discharged to [facility] TCU where she made fair progress in therapies. Torsemide was decreased to 40 mg bid and losartan decreased to 12.5 mg daily for hypotension and weight 177 lbs. She received cipro for UTI.   She was sent to the [facility] ED 11/28/2023 with acute onset of severe left hip pain. CT showed inflammation of the left iliopsoas muscle. She was unable to tolerate MRI. Ortho was concerned for occult fracture and plan was for MRI under general anesthesia, which was cancelled by Anesthesia due to potential risk. Pain spontaneously resolved and she was able to participate in therapies. Losartan was increased back to 25 mg daily. She  returned to the facility 12/5/2023.     She was sent to the [facility] ED 1/30/2024 with an unresponsive episode. EMS reported seizure like activity lasting for 10-15 mins. No seizure history. Video EEG was negative. CT head negative for acute pathology. ECHO: EF 25-30%. She was treated for possible cellulitis of her LLE and UTI. UC 1/31/2024 with <10,000 mixed organisms. She tested positive for COVID-19 and was not treated. Losartan, torsemide and spironolactone were decreased. Jardiance discontinued due to UTI. Psychiatry consulted and ordered zyprexa prn for delirium. She returned to the facility 2/6/2024.   She was sent to the [facility] ED 2/7/2024 with unresponsive episode and BP 96/50. She was alert and interactive in the ED. Labs were within range and she returned to the facility.     She is unable to provide history. Alert and talkative, though mostly nonsensical. Denies feeling ill. Ate well at breakfast.Self propelling her wheelchair about the unit per usual. Afebrile, no hypoxia.   Staff found me later in the day to report patient was unresponsive. Blood glucose was 176, /73 HR 94. She appeared to be in a deep sleep, opened her eyes and flinched with squeezing her upper arm. About 45 mins later, she was fully alert and wheeling herself down the naidu.   Spoke with her  Madhu and reviewed decline in cognition over the past 1-2 months, recent hospitalization and ED visit.        CODE STATUS/ADVANCE DIRECTIVES DISCUSSION:  No CPR- Do NOT Intubate   ALLERGIES:   Allergies   Allergen Reactions    Shellfish Allergy Shortness Of Breath, Anaphylaxis and Hives    Shellfish-Derived Products Shortness Of Breath, Anaphylaxis and Hives    Cefuroxime Other (See Comments)     Odd throat sensations - mucus, trouble swallowing (10/2013)    Celecoxib GI Disturbance    Levofloxacin Dizziness, Diarrhea and GI Disturbance     And arthralgia    Amoxicillin Unknown    Azithromycin Hives    Citalopram Other (See Comments)      Flushing    Doxycycline GI Disturbance     Bloating    Lodine [Etodolac] Unknown    Nitrofurantoin Other (See Comments)     Paresthesias    Penicillins Hives and Rash    Sulfa Antibiotics Hives     Takes Methotrexate    Sulfamethoxazole-Trimethoprim Rash    Sulindac Swelling     Swelling in hands plus gas, bloating, and loss of appetite    Valacyclovir Diarrhea      PAST MEDICAL HISTORY:   Past Medical History:   Diagnosis Date    A-fib (H)     Arthritis     CAD (coronary artery disease)     CKD (chronic kidney disease) stage 3, GFR 30-59 ml/min (H)     Depression with anxiety     Diabetes mellitus, type 2 (H)     Diverticulitis     perforation 10/2013    HFrEF (heart failure with reduced ejection fraction) (H)     HTN (hypertension)     Ischemic cardiomyopathy     Lymphedema     NSTEMI (non-ST elevated myocardial infarction) (H) 11/2021    stent to LAD    PVD (peripheral vascular disease) (H24)     RA (rheumatoid arthritis) (H)     Severe tricuspid regurgitation     Shingles 2006    Umbilical hernia without obstruction and without gangrene     Uncomplicated asthma     Vascular dementia (H)       PAST SURGICAL HISTORY:   has a past surgical history that includes tonsillectomy; Cataract Extraction (Bilateral); Stent; and Cholecystectomy (04/2023).  FAMILY HISTORY: family history includes Cancer in her brother; Hypertension in her brother and mother; Throat cancer in her father.  SOCIAL HISTORY:   reports that she has never smoked. She has never used smokeless tobacco. She reports that she does not currently use alcohol. She reports that she does not use drugs.  Patient's living condition: lives in an assisted living facility    Post Discharge Medication Reconciliation Status:   MED REC REQUIRED  Post Medication Reconciliation Status: discharge medications reconciled and changed, per note/orders       Current Outpatient Medications   Medication Sig    acetaminophen (TYLENOL) 325 MG tablet Take 2 tablets (650 mg) by  mouth every 4 hours as needed for mild pain    digoxin (LANOXIN) 125 MCG tablet Take 1 tablet (125 mcg) by mouth daily    dimethicone-zinc oxide (ANSLEY PROTECT) external cream Apply topically daily    ELIQUIS ANTICOAGULANT 5 MG tablet TAKE 1 TABLET BY MOUTH TWICE DAILY FOR HC    folic acid (FOLVITE) 400 MCG tablet Take 1 tablet (400 mcg) by mouth 2 times daily    losartan (COZAAR) 25 MG tablet Take 12.5 mg by mouth daily    metFORMIN (GLUCOPHAGE) 500 MG tablet Take 2 tablets (1,000 mg) by mouth 2 times daily (with meals)    metoprolol succinate ER (TOPROL XL) 25 MG 24 hr tablet Take 1 tablet (25 mg) by mouth daily    multivitamin, therapeutic (THERA-VIT) TABS tablet Take 1 tablet by mouth daily    nitroGLYcerin (NITROSTAT) 0.4 MG sublingual tablet Place 0.4 mg under the tongue every 5 minutes as needed for chest pain For chest pain place 1 tablet under the tongue every 5 minutes for 3 doses. If symptoms persist 5 minutes after 1st dose call 911.    nystatin (MYCOSTATIN) 317833 UNIT/GM external cream Apply topically 2 times daily To reddened groin, buttocks and qiana area    potassium chloride ER (K-TAB) 20 MEQ CR tablet Take 1 tablet (20 mEq) by mouth 2 times daily    rosuvastatin (CRESTOR) 10 MG tablet Take 1 tablet (10 mg) by mouth daily    sertraline (ZOLOFT) 50 MG tablet TAKE 1 TABLET BY MOUTH ONCE DAILY    spironolactone (ALDACTONE) 25 MG tablet Take 0.5 tablets (12.5 mg) by mouth daily    torsemide (DEMADEX) 20 MG tablet Take 2 tablets (40 mg) by mouth 2 times daily (Patient taking differently: Take 40 mg by mouth daily)    vitamin D3 (CHOLECALCIFEROL) 50 mcg (2000 units) tablet Take 1 tablet (50 mcg) by mouth daily     No current facility-administered medications for this visit.       ROS:  Unable to obtain due to dementia. Positives as noted under HPI.       Vitals:  BP (!) 159/64   Pulse 106   Temp 97  F (36.1  C)   Resp 19   Wt 77.4 kg (170 lb 9.6 oz)   BMI 29.28 kg/m    Exam:  GENERAL APPEARANCE:   Alert, in no distress  ENT:  [place], oropharynx clear  EYES:  conjunctiva and lids normal  RESP:  lungs clear to auscultation , no respiratory distress  CV:  irregular rate and rhythm, no murmur, peripheral edema 1-2+ in both LE  ABDOMEN:  soft, non-tender, no distension  M/S:   wheelchair. DICKERSON with good strength. No joint inflammation   SKIN:  stasis changes both LE.No open areas. No warmth or erythema of LLE  PSYCH:  oriented to self, situation, insight and judgement impaired, memory impaired , affect and mood normal    Lab/Diagnostic data:  Recent labs in [facility] reviewed by me today.   Echocardiogram 1/31/2024    Final Impressions:   1. LVEF 25-30%. Normal LV size.   2. Large LAD territory akinesis.   3. No LV thrombus [contrast study].   4. Moderately dilated RV with moderately reduced global function.   5. Mild mitral regurgitation.   6. Mild aortic regurgitation.   7. Severe ELEANOR.   8. Severe MAC. Mild degenerative MS [mean gradient 3 mmHg, HR 65 bpm].   9. Moderate tricuspid regurgitation.  10. PASP 60 mmHg, mean RAP 15 mmHg included.  11. Mildly dilated ascending aorta [3.8 cm].       ASSESSMENT / PLAN:  (U07.1) Infection due to 2019 novel coronavirus  (primary encounter diagnosis)  Comment: appears asymptomatic. No hypoxia or fever   Plan: monitor temp, O2 sats, symptoms    agrees with plan of care  Discussed with staff     (L03.116) Cellulitis of left lower extremity  Comment:  resolved   Plan: monitor     (F01.B0) Moderate vascular dementia without behavioral disturbance, psychotic disturbance, mood disturbance, or anxiety (H)  (F41.8) Depression with anxiety  Comment: progressive disease with more rapid decline in cognition over the past 2 months. CT head negative for acute pathology.   Plan: continue sertraline. Memory Care staff assistance with cares, meals, activities, med admin      (E11.22,  N18.31) Type 2 diabetes mellitus with stage 3a chronic kidney disease, without long-term current use of  insulin (H)  Comment: Jardiance dc'd due to recurrent UTI. Trulicity was on hold prior to hospitalization due to concerns for hypoglycemia.   HgbA1c 6.7 on 10/19/2023, creatinine 0.94 on 2/4/2024  Plan: discontinue Trulicity. Continue metformin 1000 mg bid. HgbA1c, BMP. Monitor blood glucose    (I48.20) Chronic atrial fibrillation (H)  Comment: rate controlled  Digoxin level 0.7 on 1/30/2024  Plan: continue digoxin 0.125 mg daily, metoprolol ER 25 mg daily, apixaban 5 mg bid.     (I50.20) HFrEF (heart failure with reduced ejection fraction) (H)  Comment: appears euvolemic. Weight 170 lbs is down ~10 lbs in 2 months.   Torsemide, losartan and spironolactone decreased while inpatient   Plan: continue torsemide 40 mg daily, spironolactone 12.5 mg daily, metoprolol ER 25 mg daily, digoxin 0.125 mg daily. Hold losartan due to soft BPs. Follow weight, symptoms     (I89.0) Lymphedema  Comment: less edema today   Plan: continue compression wraps, elevate legs when able. Diuretics as above     (I25.10) Coronary artery disease involving native coronary artery of native heart without angina pectoris  Comment: history of MI and stent 11/2021. No chest pain or acute issues   Plan: continue metoprolol, statin. Hold losartan due to soft BPs-see below.     (I10) Primary hypertension  Comment: BPs soft: 116/70, 109/59, 96/50  Plan: hold losartan until further orders. Continue metoprolol, torsemide, spironolactone.         Electronically signed by:  SHALONDA Kincaid CNP

## 2024-02-08 NOTE — LETTER
2/8/2024        RE: Rachel Weiss  [facility]   [address] [address]        [facility] GERIATRICS    PRIMARY CARE PROVIDER AND CLINIC:  SHALONDA Kincaid [facility], [address] [facility] [address]   Chief Complaint   Patient presents with     Hospital F/U      [facility] Medical Record Number:  [id number]  Place of Service where encounter took place:  [facility] ([facility]) [[id number]]    Rachel Weiss  is a 73 year old  (1950), returned to the above facility from  [facility] . Hospital stay 1/30/24 - 2/6/24.     HPI:    She admitted to Memory Care at this facility 9/26/2023 for a higher level of care following hospitalization and tcu stay.   Medical history significant for vascular dementia, HFrEF, afib, CAD with STEMI and stent to LAD 11/2021, ischemic cardiomyopathy, PVD, lymphedema, diabetes type 2, CKD stage 3, HTN, asthma, rheumatoid arthritis, depression, anxiety.   She was hospitalized at [facility] 9/6-9/13/2023 with acute on chronic CHF and was found to be in afib with RVR. Cardiology consulted. She was diuresed with lasix IV and discharged on  torsemide (increased from 60 mg daily to 60 mg in am and 40 mg in pm). Empagliflozin was started. Digoxin dose was increased and metoprolol continued for afib. She was diuresed for 14 lbs with weight 181 lbs at discharge. Estimated dry weight ~182 lbs. She discharged to [facility] TCU where she made fair progress in therapies. Torsemide was decreased to 40 mg bid and losartan decreased to 12.5 mg daily for hypotension and weight 177 lbs. She received cipro for UTI.   She was sent to the [facility] ED 11/28/2023 with acute onset of severe left hip pain. CT showed inflammation of the left iliopsoas muscle. She was unable to tolerate MRI. Ortho was concerned for occult fracture and plan was for MRI under general anesthesia, which was cancelled by Anesthesia due to potential risk. Pain  spontaneously resolved and she was able to participate in therapies. Losartan was increased back to 25 mg daily. She returned to the facility 12/5/2023.     She was sent to the [facility] ED 1/30/2024 with an unresponsive episode. EMS reported seizure like activity lasting for 10-15 mins. No seizure history. Video EEG was negative. CT head negative for acute pathology. ECHO: EF 25-30%. She was treated for possible cellulitis of her LLE and UTI. UC 1/31/2024 with <10,000 mixed organisms. She tested positive for COVID-19 and was not treated. Losartan, torsemide and spironolactone were decreased. Jardiance discontinued due to UTI. Psychiatry consulted and ordered zyprexa prn for delirium. She returned to the facility 2/6/2024.   She was sent to the [facility] ED 2/7/2024 with unresponsive episode and BP 96/50. She was alert and interactive in the ED. Labs were within range and she returned to the facility.     She is unable to provide history. Alert and talkative, though mostly nonsensical. Denies feeling ill. Ate well at breakfast.Self propelling her wheelchair about the unit per usual. Afebrile, no hypoxia.   Staff found me later in the day to report patient was unresponsive. Blood glucose was 176, /73 HR 94. She appeared to be in a deep sleep, opened her eyes and flinched with squeezing her upper arm. About 45 mins later, she was fully alert and wheeling herself down the naidu.   Spoke with her  Madhu and reviewed decline in cognition over the past 1-2 months, recent hospitalization and ED visit.        CODE STATUS/ADVANCE DIRECTIVES DISCUSSION:  No CPR- Do NOT Intubate   ALLERGIES:   Allergies   Allergen Reactions     Shellfish Allergy Shortness Of Breath, Anaphylaxis and Hives     Shellfish-Derived Products Shortness Of Breath, Anaphylaxis and Hives     Cefuroxime Other (See Comments)     Odd throat sensations - mucus, trouble swallowing (10/2013)     Celecoxib GI Disturbance     Levofloxacin Dizziness, Diarrhea and  GI Disturbance     And arthralgia     Amoxicillin Unknown     Azithromycin Hives     Citalopram Other (See Comments)     Flushing     Doxycycline GI Disturbance     Bloating     Lodine [Etodolac] Unknown     Nitrofurantoin Other (See Comments)     Paresthesias     Penicillins Hives and Rash     Sulfa Antibiotics Hives     Takes Methotrexate     Sulfamethoxazole-Trimethoprim Rash     Sulindac Swelling     Swelling in hands plus gas, bloating, and loss of appetite     Valacyclovir Diarrhea      PAST MEDICAL HISTORY:   Past Medical History:   Diagnosis Date     A-fib (H)      Arthritis      CAD (coronary artery disease)      CKD (chronic kidney disease) stage 3, GFR 30-59 ml/min (H)      Depression with anxiety      Diabetes mellitus, type 2 (H)      Diverticulitis     perforation 10/2013     HFrEF (heart failure with reduced ejection fraction) (H)      HTN (hypertension)      Ischemic cardiomyopathy      Lymphedema      NSTEMI (non-ST elevated myocardial infarction) (H) 11/2021    stent to LAD     PVD (peripheral vascular disease) (H24)      RA (rheumatoid arthritis) (H)      Severe tricuspid regurgitation      Shingles 2006     Umbilical hernia without obstruction and without gangrene      Uncomplicated asthma      Vascular dementia (H)       PAST SURGICAL HISTORY:   has a past surgical history that includes tonsillectomy; Cataract Extraction (Bilateral); Stent; and Cholecystectomy (04/2023).  FAMILY HISTORY: family history includes Cancer in her brother; Hypertension in her brother and mother; Throat cancer in her father.  SOCIAL HISTORY:   reports that she has never smoked. She has never used smokeless tobacco. She reports that she does not currently use alcohol. She reports that she does not use drugs.  Patient's living condition: lives in an assisted living facility    Post Discharge Medication Reconciliation Status:   MED REC REQUIRED  Post Medication Reconciliation Status: discharge medications reconciled and  changed, per note/orders       Current Outpatient Medications   Medication Sig     acetaminophen (TYLENOL) 325 MG tablet Take 2 tablets (650 mg) by mouth every 4 hours as needed for mild pain     digoxin (LANOXIN) 125 MCG tablet Take 1 tablet (125 mcg) by mouth daily     dimethicone-zinc oxide (ANSLEY PROTECT) external cream Apply topically daily     ELIQUIS ANTICOAGULANT 5 MG tablet TAKE 1 TABLET BY MOUTH TWICE DAILY FOR HC     folic acid (FOLVITE) 400 MCG tablet Take 1 tablet (400 mcg) by mouth 2 times daily     losartan (COZAAR) 25 MG tablet Take 12.5 mg by mouth daily     metFORMIN (GLUCOPHAGE) 500 MG tablet Take 2 tablets (1,000 mg) by mouth 2 times daily (with meals)     metoprolol succinate ER (TOPROL XL) 25 MG 24 hr tablet Take 1 tablet (25 mg) by mouth daily     multivitamin, therapeutic (THERA-VIT) TABS tablet Take 1 tablet by mouth daily     nitroGLYcerin (NITROSTAT) 0.4 MG sublingual tablet Place 0.4 mg under the tongue every 5 minutes as needed for chest pain For chest pain place 1 tablet under the tongue every 5 minutes for 3 doses. If symptoms persist 5 minutes after 1st dose call 911.     nystatin (MYCOSTATIN) 799928 UNIT/GM external cream Apply topically 2 times daily To reddened groin, buttocks and qiana area     potassium chloride ER (K-TAB) 20 MEQ CR tablet Take 1 tablet (20 mEq) by mouth 2 times daily     rosuvastatin (CRESTOR) 10 MG tablet Take 1 tablet (10 mg) by mouth daily     sertraline (ZOLOFT) 50 MG tablet TAKE 1 TABLET BY MOUTH ONCE DAILY     spironolactone (ALDACTONE) 25 MG tablet Take 0.5 tablets (12.5 mg) by mouth daily     torsemide (DEMADEX) 20 MG tablet Take 2 tablets (40 mg) by mouth 2 times daily (Patient taking differently: Take 40 mg by mouth daily)     vitamin D3 (CHOLECALCIFEROL) 50 mcg (2000 units) tablet Take 1 tablet (50 mcg) by mouth daily     No current facility-administered medications for this visit.       ROS:  Unable to obtain due to dementia. Positives as noted under  HPI.       Vitals:  BP (!) 159/64   Pulse 106   Temp 97  F (36.1  C)   Resp 19   Wt 77.4 kg (170 lb 9.6 oz)   BMI 29.28 kg/m    Exam:  GENERAL APPEARANCE:  Alert, in no distress  ENT:  [place], oropharynx clear  EYES:  conjunctiva and lids normal  RESP:  lungs clear to auscultation , no respiratory distress  CV:  irregular rate and rhythm, no murmur, peripheral edema 1-2+ in both LE  ABDOMEN:  soft, non-tender, no distension  M/S:   wheelchair. DICKERSON with good strength. No joint inflammation   SKIN:  stasis changes both LE.No open areas. No warmth or erythema of LLE  PSYCH:  oriented to self, situation, insight and judgement impaired, memory impaired , affect and mood normal    Lab/Diagnostic data:  Recent labs in [facility] reviewed by me today.   Echocardiogram 1/31/2024    Final Impressions:   1. LVEF 25-30%. Normal LV size.   2. Large LAD territory akinesis.   3. No LV thrombus [contrast study].   4. Moderately dilated RV with moderately reduced global function.   5. Mild mitral regurgitation.   6. Mild aortic regurgitation.   7. Severe ELEANOR.   8. Severe MAC. Mild degenerative MS [mean gradient 3 mmHg, HR 65 bpm].   9. Moderate tricuspid regurgitation.  10. PASP 60 mmHg, mean RAP 15 mmHg included.  11. Mildly dilated ascending aorta [3.8 cm].       ASSESSMENT / PLAN:  (U07.1) Infection due to 2019 novel coronavirus  (primary encounter diagnosis)  Comment: appears asymptomatic. No hypoxia or fever   Plan: monitor temp, O2 sats, symptoms    agrees with plan of care  Discussed with staff     (L03.116) Cellulitis of left lower extremity  Comment:  resolved   Plan: monitor     (F01.B0) Moderate vascular dementia without behavioral disturbance, psychotic disturbance, mood disturbance, or anxiety (H)  (F41.8) Depression with anxiety  Comment: progressive disease with more rapid decline in cognition over the past 2 months. CT head negative for acute pathology.   Plan: continue sertraline. Memory Care staff assistance  with cares, meals, activities, med admin      (E11.22,  N18.31) Type 2 diabetes mellitus with stage 3a chronic kidney disease, without long-term current use of insulin (H)  Comment: Jardiance dc'd due to recurrent UTI. Trulicity was on hold prior to hospitalization due to concerns for hypoglycemia.   HgbA1c 6.7 on 10/19/2023, creatinine 0.94 on 2024  Plan: discontinue Trulicity. Continue metformin 1000 mg bid. HgbA1c, BMP. Monitor blood glucose    (I48.20) Chronic atrial fibrillation (H)  Comment: rate controlled  Digoxin level 0.7 on 2024  Plan: continue digoxin 0.125 mg daily, metoprolol ER 25 mg daily, apixaban 5 mg bid.     (I50.20) HFrEF (heart failure with reduced ejection fraction) (H)  Comment: appears euvolemic. Weight 170 lbs is down ~10 lbs in 2 months.   Torsemide, losartan and spironolactone decreased while inpatient   Plan: continue torsemide 40 mg daily, spironolactone 12.5 mg daily, metoprolol ER 25 mg daily, digoxin 0.125 mg daily. Hold losartan due to soft BPs. Follow weight, symptoms     (I89.0) Lymphedema  Comment: less edema today   Plan: continue compression wraps, elevate legs when able. Diuretics as above     (I25.10) Coronary artery disease involving native coronary artery of native heart without angina pectoris  Comment: history of MI and stent 2021. No chest pain or acute issues   Plan: continue metoprolol, statin. Hold losartan due to soft BPs-see below.     (I10) Primary hypertension  Comment: BPs soft: 116/70, 109/59, 96/50  Plan: hold losartan until further orders. Continue metoprolol, torsemide, spironolactone.         Electronically signed by:  SHALONDA Kincaid CNP [id number]    Orders 2024:  DISCONTINUE Trulicity  Labs next week: BMP, digoxin level (CHF)  Discussed with SHALONDA Ann CNP on 2024 at 11:23 AM        Sincerely,        SHALONDA Kincaid CNP

## 2024-02-09 VITALS
DIASTOLIC BLOOD PRESSURE: 73 MMHG | WEIGHT: 170.6 LBS | TEMPERATURE: 97 F | BODY MASS INDEX: 29.28 KG/M2 | RESPIRATION RATE: 19 BRPM | HEART RATE: 94 BPM | OXYGEN SATURATION: 99 % | SYSTOLIC BLOOD PRESSURE: 129 MMHG

## 2024-02-09 NOTE — PROGRESS NOTES
Rachel Weiss    1950    Orders 2024:  DISCONTINUE Trulicity  Labs next week: BMP, digoxin level (CHF)  Discussed with SHALONDA Ann CNP on 2024 at 11:23 AM

## 2024-02-13 ENCOUNTER — ASSISTED LIVING VISIT (OUTPATIENT)
Dept: GERIATRICS | Facility: CLINIC | Age: 74
End: 2024-02-13
Payer: COMMERCIAL

## 2024-02-13 ENCOUNTER — DOCUMENTATION ONLY (OUTPATIENT)
Dept: GERIATRICS | Facility: CLINIC | Age: 74
End: 2024-02-13

## 2024-02-13 VITALS
DIASTOLIC BLOOD PRESSURE: 64 MMHG | TEMPERATURE: 97 F | SYSTOLIC BLOOD PRESSURE: 159 MMHG | WEIGHT: 160.2 LBS | HEART RATE: 106 BPM | BODY MASS INDEX: 27.5 KG/M2 | RESPIRATION RATE: 19 BRPM

## 2024-02-13 DIAGNOSIS — F22 PARANOIA (H): ICD-10-CM

## 2024-02-13 DIAGNOSIS — F41.8 DEPRESSION WITH ANXIETY: ICD-10-CM

## 2024-02-13 DIAGNOSIS — F01.B0 MODERATE VASCULAR DEMENTIA WITHOUT BEHAVIORAL DISTURBANCE, PSYCHOTIC DISTURBANCE, MOOD DISTURBANCE, OR ANXIETY (H): Primary | ICD-10-CM

## 2024-02-13 PROCEDURE — 99349 HOME/RES VST EST MOD MDM 40: CPT | Performed by: NURSE PRACTITIONER

## 2024-02-13 NOTE — PROGRESS NOTES
[facility] GERIATRICS    Chief Complaint   Patient presents with    RECHECK     HPI:  Rachel Weiss is a 73 year old  (1950), who is being seen today for an episodic care visit at: THE [facility] ([facility]) [[id number]].   She admitted to Memory Care at this facility 9/26/2023 for a higher level of care following hospitalization and tcu stay.   Medical history significant for vascular dementia, HFrEF, afib, CAD with STEMI and stent to LAD 11/2021, ischemic cardiomyopathy, PVD, lymphedema, diabetes type 2, CKD stage 3, HTN, asthma, rheumatoid arthritis, depression, anxiety.   She was hospitalized at [facility] 9/6-9/13/2023 with acute on chronic CHF and was found to be in afib with RVR. Cardiology consulted. She was diuresed with lasix IV and discharged on  torsemide (increased from 60 mg daily to 60 mg in am and 40 mg in pm). Empagliflozin was started. Digoxin dose was increased and metoprolol continued for afib. She was diuresed for 14 lbs with weight 181 lbs at discharge. Estimated dry weight ~182 lbs. She discharged to [facility] TCU where she made fair progress in therapies. Torsemide was decreased to 40 mg bid and losartan decreased to 12.5 mg daily for hypotension and weight 177 lbs. She received cipro for UTI.   She was sent to the [facility] ED 11/28/2023 with acute onset of severe left hip pain. CT showed inflammation of the left iliopsoas muscle. She was unable to tolerate MRI. Ortho was concerned for occult fracture and plan was for MRI under general anesthesia, which was cancelled by Anesthesia due to potential risk. Pain spontaneously resolved and she was able to participate in therapies. Losartan was increased back to 25 mg daily. She returned to the facility 12/5/2023.   She was sent to the [facility] ED 1/30/2024 with an unresponsive episode. EMS reported seizure like activity lasting for 10-15 mins. No seizure history. Video EEG was negative. CT head negative for acute pathology. ECHO: EF 25-30%.  She was treated for possible cellulitis of her LLE and UTI. UC 1/31/2024 with <10,000 mixed organisms. She tested positive for COVID-19 and was not treated. Losartan, torsemide and spironolactone were decreased. Jardiance discontinued due to UTI. Psychiatry consulted and ordered zyprexa prn for delirium. She returned to the facility 2/6/2024.   She was sent to the [facility] ED 2/7/2024 with unresponsive episode and BP 96/50. She was alert and interactive in the ED. Labs and VS were within range and she returned to the facility.     Today's concern is:      Moderate vascular dementia without behavioral disturbance, psychotic disturbance, mood disturbance, or anxiety (H)  Paranoia (H)  Depression with anxiety  She is seen today after her  and home OT stopped me on the unit to report increased paranoia and behaviors for the past few days. Her cognition has significantly declined in recent months and appears unchanged today. Her  reports that she has been more verbally abusive and aggressive towards him. She has also made comments that people are trying to kill her.   She is a poor historian. Pleasantly confused and conversation is nonsensical. Self propelling about the unit in her wheelchair. Staff agrees she has become more paranoid.       Allergies, and PMH/PSH reviewed in EPIC today    REVIEW OF SYSTEMS:  Unable to obtain due to dementia. Positives as noted under HPI.       Objective:   BP (!) 159/64   Pulse 106   Temp 97  F (36.1  C)   Resp 19   Wt 72.7 kg (160 lb 3.2 oz)   BMI 27.50 kg/m    GENERAL APPEARANCE:  Alert, in no distress  ENT:  [place], oropharynx clear  EYES:  conjunctiva and lids normal  RESP:  lungs clear to auscultation  CV:  irregular rate and rhythm, no murmur, peripheral edema 1+ in both LE  ABDOMEN:  soft, non-tender, no distension  M/S:   wheelchair. DICKERSON with appropriate strength. No joint inflammation   SKIN:  stasis changes both LE.No open areas. No warmth or erythema of LE   PSYCH:   oriented to self, situation, insight and judgement impaired, memory impaired , affect and mood normal    Recent labs in EPIC reviewed by me today.     ASSESSMENT / PLAN:  (F01.B0) Moderate vascular dementia without behavioral disturbance, psychotic disturbance, mood disturbance, or anxiety (H)  (primary encounter diagnosis)  (F22) Paranoia (H)  (F41.8) Depression with anxiety  Comment: progressive disease with increased paranoia, delusional thinking. No s/s of acute illness   Plan: low dose seroquel 6.25 mg HS and daily prn-potential side effects reviewed with  and her home occupational therapist. Continue sertraline. Memory Care staff assistance with cares, meals, activities, med admin. Labs as previously ordered.    agrees with plan of care  Discussed with staff.           Electronically signed by: SHALONDA Kincaid CNP

## 2024-02-13 NOTE — LETTER
2/13/2024        RE: Rachel Singh[address] [address]        No notes on file      Sincerely,        SHALONDA Kincaid CNP

## 2024-02-16 ENCOUNTER — LAB REQUISITION (OUTPATIENT)
Dept: LAB | Facility: HOSPITAL | Age: 74
End: 2024-02-16
Payer: COMMERCIAL

## 2024-02-16 ENCOUNTER — LAB REQUISITION (OUTPATIENT)
Dept: LAB | Facility: CLINIC | Age: 74
End: 2024-02-16
Payer: COMMERCIAL

## 2024-02-16 DIAGNOSIS — I50.40 UNSPECIFIED COMBINED SYSTOLIC (CONGESTIVE) AND DIASTOLIC (CONGESTIVE) HEART FAILURE (H): ICD-10-CM

## 2024-02-16 LAB
ANION GAP SERPL CALCULATED.3IONS-SCNC: 13 MMOL/L (ref 7–15)
BUN SERPL-MCNC: 21.3 MG/DL (ref 8–23)
CALCIUM SERPL-MCNC: 9.5 MG/DL (ref 8.8–10.2)
CHLORIDE SERPL-SCNC: 101 MMOL/L (ref 98–107)
CREAT SERPL-MCNC: 1.01 MG/DL (ref 0.51–0.95)
DEPRECATED HCO3 PLAS-SCNC: 22 MMOL/L (ref 22–29)
DIGOXIN SERPL-MCNC: 0.5 NG/ML (ref 0.6–2)
EGFRCR SERPLBLD CKD-EPI 2021: 58 ML/MIN/1.73M2
GLUCOSE SERPL-MCNC: 103 MG/DL (ref 70–99)
HOLD SPECIMEN: NORMAL
POTASSIUM SERPL-SCNC: 6 MMOL/L (ref 3.4–5.3)
SODIUM SERPL-SCNC: 136 MMOL/L (ref 135–145)

## 2024-02-16 PROCEDURE — 80162 ASSAY OF DIGOXIN TOTAL: CPT | Mod: ORL | Performed by: NURSE PRACTITIONER

## 2024-02-16 PROCEDURE — 80048 BASIC METABOLIC PNL TOTAL CA: CPT | Mod: ORL | Performed by: NURSE PRACTITIONER

## 2024-02-17 ENCOUNTER — TELEPHONE (OUTPATIENT)
Dept: GERIATRICS | Facility: CLINIC | Age: 74
End: 2024-02-17
Payer: COMMERCIAL

## 2024-02-17 NOTE — CONFIDENTIAL NOTE
Rachel Weiss is a 73 year old  (1950), Nurse called today to report: hyperkalemia, K 6.0, apparently demadex decreased previously though not potassium dosing     Decrease potassium to 20mEq daily, give 2 doses of kayexalate 15gm, recheck BMP on 2/19     Electronically signed by:   Esau Bautista NP

## 2024-02-21 ENCOUNTER — LAB REQUISITION (OUTPATIENT)
Dept: LAB | Facility: CLINIC | Age: 74
End: 2024-02-21
Payer: COMMERCIAL

## 2024-02-21 DIAGNOSIS — I50.40 UNSPECIFIED COMBINED SYSTOLIC (CONGESTIVE) AND DIASTOLIC (CONGESTIVE) HEART FAILURE (H): ICD-10-CM

## 2024-02-22 ENCOUNTER — ASSISTED LIVING VISIT (OUTPATIENT)
Dept: GERIATRICS | Facility: CLINIC | Age: 74
End: 2024-02-22
Payer: COMMERCIAL

## 2024-02-22 VITALS
BODY MASS INDEX: 29.35 KG/M2 | SYSTOLIC BLOOD PRESSURE: 159 MMHG | TEMPERATURE: 97 F | RESPIRATION RATE: 19 BRPM | DIASTOLIC BLOOD PRESSURE: 64 MMHG | HEART RATE: 106 BPM | WEIGHT: 171 LBS

## 2024-02-22 DIAGNOSIS — E11.22 TYPE 2 DIABETES MELLITUS WITH STAGE 3A CHRONIC KIDNEY DISEASE, WITHOUT LONG-TERM CURRENT USE OF INSULIN (H): ICD-10-CM

## 2024-02-22 DIAGNOSIS — N18.31 TYPE 2 DIABETES MELLITUS WITH STAGE 3A CHRONIC KIDNEY DISEASE, WITHOUT LONG-TERM CURRENT USE OF INSULIN (H): ICD-10-CM

## 2024-02-22 DIAGNOSIS — R44.3 HALLUCINATIONS: ICD-10-CM

## 2024-02-22 DIAGNOSIS — E87.5 HYPERKALEMIA: ICD-10-CM

## 2024-02-22 DIAGNOSIS — I48.20 CHRONIC ATRIAL FIBRILLATION (H): ICD-10-CM

## 2024-02-22 DIAGNOSIS — F03.92 DEMENTIA WITH PSYCHOSIS (H): Primary | ICD-10-CM

## 2024-02-22 DIAGNOSIS — I89.0 LYMPHEDEMA: ICD-10-CM

## 2024-02-22 DIAGNOSIS — F22 PARANOIA (H): ICD-10-CM

## 2024-02-22 DIAGNOSIS — I50.20 HFREF (HEART FAILURE WITH REDUCED EJECTION FRACTION) (H): ICD-10-CM

## 2024-02-22 LAB
ANION GAP SERPL CALCULATED.3IONS-SCNC: 16 MMOL/L (ref 7–15)
BUN SERPL-MCNC: 23.4 MG/DL (ref 8–23)
CALCIUM SERPL-MCNC: 9.1 MG/DL (ref 8.8–10.2)
CHLORIDE SERPL-SCNC: 96 MMOL/L (ref 98–107)
CREAT SERPL-MCNC: 1.01 MG/DL (ref 0.51–0.95)
DEPRECATED HCO3 PLAS-SCNC: 25 MMOL/L (ref 22–29)
DIGOXIN SERPL-MCNC: 0.4 NG/ML (ref 0.6–2)
EGFRCR SERPLBLD CKD-EPI 2021: 58 ML/MIN/1.73M2
GLUCOSE SERPL-MCNC: 116 MG/DL (ref 70–99)
POTASSIUM SERPL-SCNC: 3.5 MMOL/L (ref 3.4–5.3)
SODIUM SERPL-SCNC: 137 MMOL/L (ref 135–145)

## 2024-02-22 PROCEDURE — 36415 COLL VENOUS BLD VENIPUNCTURE: CPT | Mod: ORL | Performed by: NURSE PRACTITIONER

## 2024-02-22 PROCEDURE — P9603 ONE-WAY ALLOW PRORATED MILES: HCPCS | Mod: ORL | Performed by: NURSE PRACTITIONER

## 2024-02-22 PROCEDURE — 80048 BASIC METABOLIC PNL TOTAL CA: CPT | Mod: ORL | Performed by: NURSE PRACTITIONER

## 2024-02-22 PROCEDURE — 80162 ASSAY OF DIGOXIN TOTAL: CPT | Mod: ORL | Performed by: NURSE PRACTITIONER

## 2024-02-22 PROCEDURE — 99349 HOME/RES VST EST MOD MDM 40: CPT | Performed by: NURSE PRACTITIONER

## 2024-02-22 RX ORDER — LOSARTAN POTASSIUM 25 MG/1
12.5 TABLET ORAL DAILY
COMMUNITY
End: 2024-03-19

## 2024-02-22 RX ORDER — QUETIAPINE FUMARATE 25 MG/1
TABLET, FILM COATED ORAL
Qty: 30 TABLET | Refills: 11 | Status: SHIPPED | OUTPATIENT
Start: 2024-02-22 | End: 2024-08-08 | Stop reason: DRUGHIGH

## 2024-02-22 NOTE — LETTER
2/22/2024        RE: Rachel Rasheed [address] [address]        No notes on file      Sincerely,        SHALONDA Kincaid CNP

## 2024-02-22 NOTE — PROGRESS NOTES
[facility] GERIATRICS    Chief Complaint   Patient presents with    RECHECK     HPI:  Rachel Weiss is a 73 year old  (1950), who is being seen today for an episodic care visit at: THE [facility] ([facility]) [[id number]].   She admitted to Memory Care at this facility 9/26/2023 for a higher level of care following hospitalization and tcu stay.   Medical history significant for vascular dementia, HFrEF, afib, CAD with STEMI and stent to LAD 11/2021, ischemic cardiomyopathy, PVD, lymphedema, diabetes type 2, CKD stage 3, HTN, asthma, rheumatoid arthritis, depression, anxiety.   She was hospitalized at [facility] 9/6-9/13/2023 with acute on chronic CHF and was found to be in afib with RVR. Cardiology consulted. She was diuresed with lasix IV and discharged on  torsemide (increased from 60 mg daily to 60 mg in am and 40 mg in pm). Empagliflozin was started. Digoxin dose was increased and metoprolol continued for afib. She was diuresed for 14 lbs with weight 181 lbs at discharge. Estimated dry weight ~182 lbs. She discharged to [facility] TCU where she made fair progress in therapies. Torsemide was decreased to 40 mg bid and losartan decreased to 12.5 mg daily for hypotension and weight 177 lbs. She received cipro for UTI.   She was sent to the [facility] ED 11/28/2023 with acute onset of severe left hip pain. CT showed inflammation of the left iliopsoas muscle. She was unable to tolerate MRI. Ortho was concerned for occult fracture and plan was for MRI under general anesthesia, which was cancelled by Anesthesia due to potential risk. Pain spontaneously resolved and she was able to participate in therapies. Losartan was increased back to 25 mg daily. She returned to the facility 12/5/2023.   She was sent to the [facility] ED 1/30/2024 with an unresponsive episode. EMS reported seizure like activity lasting 10-15 mins. No seizure history. Video EEG was negative. CT head negative for acute pathology. ECHO: EF 25-30%. She  "was treated for possible cellulitis of her LLE and UTI. UC 1/31/2024 with <10,000 mixed organisms. She tested positive for COVID-19 and was not treated. Losartan, torsemide and spironolactone were decreased. Jardiance discontinued due to UTI. Psychiatry consulted and ordered zyprexa prn for delirium. She returned to the facility 2/6/2024.   She was sent to the [facility] ED 2/7/2024 with unresponsive episode and BP 96/50. She was alert and interactive in the ED. Labs and VS were within range and she returned to the facility.        Today's concern is:      Dementia with psychosis (H)  Hallucinations  Paranoia (H)  HFrEF (heart failure with reduced ejection fraction) (H)  Chronic atrial fibrillation (H)  Type 2 diabetes mellitus with stage 3a chronic kidney disease, without long-term current use of insulin (H)  Lymphedema  Hyperkalemia  She is a poor historian. Reports her \"stomach hurts\" and she doesn't feel well, then asks for something to eat. She is more conversational today. Denies pain of her legs. Staff reports she had 3 loose stools last night, no vomiting. Multiple residents on the unit have GI symptoms. She consistently removes her compression wraps. Wheelchair bound and able to propel herself about the unit. Requires max assist with cares. Low dose seroquel was started last week for hallucinations and worsening paranoia. Staff reports she's been more pleasant, but remains paranoid.   Spoke with her  Madhu, who reports she has been very verbally abusive towards him and visitors. She continues to think that people are trying to kill her.       Allergies, and PMH/PSH reviewed in EPIC today    REVIEW OF SYSTEMS:  Unable to obtain due to dementia. Positives as noted under HPI.       Objective:   BP (!) 159/64   Pulse 106   Temp 97  F (36.1  C)   Resp 19   Wt 77.6 kg (171 lb)   BMI 29.35 kg/m    GENERAL APPEARANCE:  Alert, in no distress  ENT:  [place], oropharynx clear  EYES:  conjunctiva and lids normal  RESP: "  lungs clear to auscultation   CV:  irregular rate and rhythm, no murmur, peripheral edema 1-2+ in both LE  ABDOMEN:  soft, non-tender, no distension  M/S:  in recliner.  DICKERSON with appropriate strength. No joint inflammation   SKIN:  stasis changes both LE.No open areas   PSYCH:  oriented to self, insight and judgement impaired, memory impaired , affect and mood normal    Recent [facility] reviewed by me today.       ASSESSMENT / PLAN:  (F03.92) Dementia with psychosis (H)  (primary encounter diagnosis)  (R44.3) Hallucinations  (F22) Paranoia (H)  Comment: decline in cognition and functional status in recent months. Low dose seroquel started last week with some improvement in agitation, remains paranoid and verbally aggressive towards her . Tolerating seroquel without side effect.   Plan: increase seroquel to 12.5 mg at 5 pm and 12.5 mg daily prn. Continue sertraline. Memory Care staff assistance with cares, meals, activities, med admin   agrees with plan of care  Discussed with staff     (I50.20) HFrEF (heart failure with reduced ejection fraction) (H)  Comment: appears euvolemic with weight 171 lbs. She has lost ~10 lbs in the past 2 months   Torsemide, losartan and spironolactone decreased while inpatient   Plan:continue torsemide, spironolactone, metoprolol, losartan. Follow weight, symptoms     (I48.20) Chronic atrial fibrillation (H)  Comment: rate controlled  Digoxin level was 0.5 on 2/16/2024-question if med was missed. Digoxin level pending today  Plan: continue digoxin, metoprolol, apixaban    (E11.22,  N18.31) Type 2 diabetes mellitus with stage 3a chronic kidney disease, without long-term current use of insulin (H)  Comment: Jardiance dc'd due to recurrent UTI, Trulicity dc'd due to possible hypoglycemia. Blood glucose: 118-171, outlier 237  HgbA1c 6.7 on 10/19/2023  Plan: continue metformin 1000 mg bid. Monitor blood glucose, follow HgbA1c     (I89.0) Lymphedema  Comment: chronic and at  baseline. Cellulitis resolved   Plan: encourage lymph wraps, elevate legs. Continue torsemide     (E87.5) Hyperkalemia  Comment: K 6.0 on 2/16/2024 and kayexalate was given  K 3.5 today   Plan: decrease Kcl to 20 meq daily. Repeat BMP 3/7/2024.       Electronically signed by: SHALONDA Kincaid CNP

## 2024-02-23 RX ORDER — POTASSIUM CHLORIDE 1500 MG/1
20 TABLET, EXTENDED RELEASE ORAL DAILY
Qty: 30 TABLET | Refills: 11 | Status: SHIPPED | OUTPATIENT
Start: 2024-02-23

## 2024-02-23 NOTE — PROGRESS NOTES
Rachel Weiss   1950    Orders 2024:  Kcl 20 meq po daily for replacement  2.   Labs Thurs 3/7/2024: BMP, digoxin level (CHF)      SHALONDA Kincaid CNP on 2024 at 7:44 AM

## 2024-02-27 DIAGNOSIS — I50.9 CONGESTIVE HEART FAILURE, UNSPECIFIED HF CHRONICITY, UNSPECIFIED HEART FAILURE TYPE (H): ICD-10-CM

## 2024-02-28 RX ORDER — TORSEMIDE 20 MG/1
40 TABLET ORAL DAILY
Qty: 60 TABLET | Refills: 11 | Status: SHIPPED | OUTPATIENT
Start: 2024-02-28

## 2024-03-06 ENCOUNTER — LAB REQUISITION (OUTPATIENT)
Dept: LAB | Facility: CLINIC | Age: 74
End: 2024-03-06
Payer: COMMERCIAL

## 2024-03-06 DIAGNOSIS — I50.40 UNSPECIFIED COMBINED SYSTOLIC (CONGESTIVE) AND DIASTOLIC (CONGESTIVE) HEART FAILURE (H): ICD-10-CM

## 2024-03-07 PROCEDURE — 80048 BASIC METABOLIC PNL TOTAL CA: CPT | Mod: ORL | Performed by: NURSE PRACTITIONER

## 2024-03-07 PROCEDURE — P9604 ONE-WAY ALLOW PRORATED TRIP: HCPCS | Mod: ORL | Performed by: NURSE PRACTITIONER

## 2024-03-07 PROCEDURE — 36415 COLL VENOUS BLD VENIPUNCTURE: CPT | Mod: ORL | Performed by: NURSE PRACTITIONER

## 2024-03-07 PROCEDURE — 80162 ASSAY OF DIGOXIN TOTAL: CPT | Mod: ORL | Performed by: NURSE PRACTITIONER

## 2024-03-08 LAB
ANION GAP SERPL CALCULATED.3IONS-SCNC: 12 MMOL/L (ref 7–15)
BUN SERPL-MCNC: 21.4 MG/DL (ref 8–23)
CALCIUM SERPL-MCNC: 8.9 MG/DL (ref 8.8–10.2)
CHLORIDE SERPL-SCNC: 95 MMOL/L (ref 98–107)
CREAT SERPL-MCNC: 0.99 MG/DL (ref 0.51–0.95)
DEPRECATED HCO3 PLAS-SCNC: 27 MMOL/L (ref 22–29)
DIGOXIN SERPL-MCNC: 1.1 NG/ML (ref 0.6–2)
EGFRCR SERPLBLD CKD-EPI 2021: 59 ML/MIN/1.73M2
GLUCOSE SERPL-MCNC: 151 MG/DL (ref 70–99)
POTASSIUM SERPL-SCNC: 4.8 MMOL/L (ref 3.4–5.3)
SODIUM SERPL-SCNC: 134 MMOL/L (ref 135–145)

## 2024-03-15 ENCOUNTER — DOCUMENTATION ONLY (OUTPATIENT)
Dept: OTHER | Facility: CLINIC | Age: 74
End: 2024-03-15
Payer: COMMERCIAL

## 2024-03-19 ENCOUNTER — ASSISTED LIVING VISIT (OUTPATIENT)
Dept: GERIATRICS | Facility: CLINIC | Age: 74
End: 2024-03-19
Payer: COMMERCIAL

## 2024-03-19 VITALS
SYSTOLIC BLOOD PRESSURE: 131 MMHG | HEART RATE: 88 BPM | WEIGHT: 198 LBS | BODY MASS INDEX: 33.99 KG/M2 | RESPIRATION RATE: 18 BRPM | TEMPERATURE: 98.4 F | DIASTOLIC BLOOD PRESSURE: 83 MMHG

## 2024-03-19 DIAGNOSIS — M06.00 RHEUMATOID ARTHRITIS WITH NEGATIVE RHEUMATOID FACTOR, INVOLVING UNSPECIFIED SITE (H): ICD-10-CM

## 2024-03-19 DIAGNOSIS — B37.2 YEAST INFECTION OF THE SKIN: ICD-10-CM

## 2024-03-19 DIAGNOSIS — F03.92 DEMENTIA WITH PSYCHOSIS (H): Primary | ICD-10-CM

## 2024-03-19 DIAGNOSIS — I89.0 LYMPHEDEMA: ICD-10-CM

## 2024-03-19 DIAGNOSIS — N18.31 TYPE 2 DIABETES MELLITUS WITH STAGE 3A CHRONIC KIDNEY DISEASE, WITHOUT LONG-TERM CURRENT USE OF INSULIN (H): ICD-10-CM

## 2024-03-19 DIAGNOSIS — R44.3 HALLUCINATIONS: ICD-10-CM

## 2024-03-19 DIAGNOSIS — E11.22 TYPE 2 DIABETES MELLITUS WITH STAGE 3A CHRONIC KIDNEY DISEASE, WITHOUT LONG-TERM CURRENT USE OF INSULIN (H): ICD-10-CM

## 2024-03-19 DIAGNOSIS — I50.20 HFREF (HEART FAILURE WITH REDUCED EJECTION FRACTION) (H): ICD-10-CM

## 2024-03-19 DIAGNOSIS — F22 PARANOIA (H): ICD-10-CM

## 2024-03-19 DIAGNOSIS — I73.9 PVD (PERIPHERAL VASCULAR DISEASE) (H): ICD-10-CM

## 2024-03-19 DIAGNOSIS — I48.20 CHRONIC ATRIAL FIBRILLATION (H): ICD-10-CM

## 2024-03-19 PROCEDURE — 99349 HOME/RES VST EST MOD MDM 40: CPT | Performed by: NURSE PRACTITIONER

## 2024-03-19 NOTE — PROGRESS NOTES
[facility] GERIATRICS    Chief Complaint   Patient presents with    RECHECK     HPI:  Rachel Weiss is a 73 year old  (1950), who is being seen today for an episodic care visit at: THE [facility] ([facility]) [[id number]].   She admitted to Memory Care at this facility 9/26/2023 for a higher level of care following hospitalization and tcu stay.   Medical history significant for vascular dementia, HFrEF, afib, CAD with STEMI and stent to LAD 11/2021, ischemic cardiomyopathy, PVD, lymphedema, diabetes type 2, CKD stage 3, HTN, asthma, rheumatoid arthritis, depression, anxiety.   She was hospitalized at [facility] 9/6-9/13/2023 with acute on chronic CHF and was found to be in afib with RVR. She was diuresed with lasix IV and discharged on  torsemide (increased from 60 mg daily to 60 mg in am and 40 mg in pm). Empagliflozin was started. Digoxin dose was increased and metoprolol continued for afib. She was diuresed for 14 lbs with weight 181 lbs at discharge. Estimated dry weight ~182 lbs. She discharged to [facility] TCU where she made fair progress in therapies. Torsemide was decreased to 40 mg bid and losartan decreased to 12.5 mg daily for hypotension and weight 177 lbs. She received cipro for UTI.   She was sent to the [facility] ED 11/28/2023 with acute onset of severe left hip pain. CT showed inflammation of the left iliopsoas muscle. She was unable to tolerate MRI. Ortho was concerned for occult fracture and plan was for MRI under general anesthesia, which was cancelled by Anesthesia due to potential risk. Pain spontaneously resolved and she was able to participate in therapies. Losartan was increased back to 25 mg daily. She returned to the facility 12/5/2023.   She was sent to the [facility] ED 1/30/2024 with an unresponsive episode. EMS reported seizure like activity lasting 10-15 mins. No seizure history. Video EEG was negative. CT head negative for acute pathology. ECHO: EF 25-30%. She was treated for possible  "cellulitis of her LLE and UTI. UC 1/31/2024 with <10,000 mixed organisms. She tested positive for COVID-19 and was not treated. Losartan, torsemide and spironolactone were decreased. Jardiance discontinued due to UTI. Psychiatry consulted and ordered zyprexa prn for delirium. She returned to the facility 2/6/2024.   She was sent to the [facility] ED 2/7/2024 with unresponsive episode and BP 96/50. She was alert and interactive in the ED. Labs and VS were within range and she returned to the facility.     Today's concern is:      Dementia with psychosis (H)  Paranoia (H)  Hallucinations  HFrEF (heart failure with reduced ejection fraction) (H)  Chronic atrial fibrillation (H)  Type 2 diabetes mellitus with stage 3a chronic kidney disease, without long-term current use of insulin (H)  Rheumatoid arthritis with negative rheumatoid factor, involving unspecified site (H)  PVD (peripheral vascular disease) (H24)  Lymphedema  Yeast infection of the skin  She is a poor historian. Pleasant and talkative, more conversational than usual. Denies feeling ill. Denies pain. Staff reports paranoia and hallucinations have improved since seroquel was started last month. She's had 2 recent falls, no injury. Wheelchair bound and requires assist of 1 with transfers and cares.   Spoke with her  Madhu Weiss, who reports her mood has been \"fabulous\" since starting seroquel. She has been pleasant during their visits and less verbally abusive. Still paranoid and thinks people are poisoning her, but improved. He thinks she was hiding pills and not taking them, but has not recently found pills in her room.       Allergies, and PMH/PSH reviewed in EPIC today    REVIEW OF SYSTEMS:  Unable to obtain due to dementia. Positives as noted under HPI.       Objective:   /83   Pulse 88   Temp 98.4  F (36.9  C)   Resp 18   Wt 89.8 kg (198 lb)   BMI 33.99 kg/m    GENERAL APPEARANCE:  Alert, in no distress  ENT:  [place], oropharynx clear  EYES:  " conjunctiva and lids normal  RESP:  lungs clear to auscultation   CV:  irregular rate and rhythm, no murmur, peripheral edema 1-2+ in both LE  ABDOMEN:  soft, non-tender, no distension  M/S:  wheelchair. DICKERSON with appropriate strength. No joint inflammation   SKIN:  erythematous rash under left breast. Stasis changes both LE, skin intact   PSYCH:  oriented to self, insight and judgement impaired, memory impaired , affect and mood normal    Recent labs in [facility] reviewed by me today.     ASSESSMENT / PLAN:  (F03.92) Dementia with psychosis (H)  (primary encounter diagnosis)  (F22) Paranoia (H)  (R44.3) Hallucinations  Comment: mood and psychosis improved. Tolerating low dose seroquel   Plan: continue seroquel 12.5 mg at 5 pm, plus 12.5 mg daily prn-prn dose has been used X 1. Continue sertraline 50 mg daily. Memory Care staff assistance with cares, meals, activities, med admin   agrees with plan of care  Discussed with staff     (I50.20) HFrEF (heart failure with reduced ejection fraction) (H)  Comment: most recent weight is up 20 lbs and may not be accurate. She appears euvolemic   Losartan has been on hold due to low BPs. Recent readings improved: 131/83, 135/59, 133/91  Plan: discontinue losartan. Continue digoxin 125 mcg daily, metoprolol ER 25 mg daily, spironolactone 12.5 mg daily, torsemide 40 mg daily. Follow weight, symptoms     (I48.20) Chronic atrial fibrillation (H)  Comment: HR: 86-90  Digoxin level has been therapeutic-suspect she missed doses of meds   Plan: continue digoxin, metoprolol, apixaban. Recheck digoxin    (E11.22,  N18.31) Type 2 diabetes mellitus with stage 3a chronic kidney disease, without long-term current use of insulin (H)  Comment: blood glucose: 114-166, outliers 192, 212  Jardiance dc'd due to recurrent UTI, Trulicity dc'd due to hypoglycemia.   HgbA1c 6.7 on 10/19/2023  Plan: continue metformin. Monitor blood glucose     (M06.00) Rheumatoid arthritis with negative rheumatoid  factor, involving unspecified site (H)  Comment: no acute issues. Last saw Rheumatology 5/2021  Plan: monitor     (I73.9) PVD (peripheral vascular disease) (H24)  (I89.0) Lymphedema  Comment: edema appears at baseline. She is not fully compliant with compression wraps   Plan: compression wraps and elevate legs as able. Torsemide as above     (B37.2) Yeast infection of the skin  Comment: rash under left breast  Plan: nystatin cream bid, keep area clean and dry         Electronically signed by: SHALONDA Kincaid CNP

## 2024-03-19 NOTE — LETTER
3/19/2024        RE: Rachel Weiss  [facility]   [address] [facility] [address]        [facility] GERIATRICS    Chief Complaint   Patient presents with     RECHECK     HPI:  Rachel Weiss is a 73 year old  (1950), who is being seen today for an episodic care visit at: THE [facility] ([facility]) [[id number]].   She admitted to Memory Care at this facility 9/26/2023 for a higher level of care following hospitalization and tcu stay.   Medical history significant for vascular dementia, HFrEF, afib, CAD with STEMI and stent to LAD 11/2021, ischemic cardiomyopathy, PVD, lymphedema, diabetes type 2, CKD stage 3, HTN, asthma, rheumatoid arthritis, depression, anxiety.   She was hospitalized at [facility] 9/6-9/13/2023 with acute on chronic CHF and was found to be in afib with RVR. She was diuresed with lasix IV and discharged on  torsemide (increased from 60 mg daily to 60 mg in am and 40 mg in pm). Empagliflozin was started. Digoxin dose was increased and metoprolol continued for afib. She was diuresed for 14 lbs with weight 181 lbs at discharge. Estimated dry weight ~182 lbs. She discharged to [facility] TCU where she made fair progress in therapies. Torsemide was decreased to 40 mg bid and losartan decreased to 12.5 mg daily for hypotension and weight 177 lbs. She received cipro for UTI.   She was sent to the [facility] ED 11/28/2023 with acute onset of severe left hip pain. CT showed inflammation of the left iliopsoas muscle. She was unable to tolerate MRI. Ortho was concerned for occult fracture and plan was for MRI under general anesthesia, which was cancelled by Anesthesia due to potential risk. Pain spontaneously resolved and she was able to participate in therapies. Losartan was increased back to 25 mg daily. She returned to the facility 12/5/2023.   She was sent to the [facility] ED 1/30/2024 with an unresponsive episode. EMS reported seizure like activity lasting 10-15 mins. No  "seizure history. Video EEG was negative. CT head negative for acute pathology. ECHO: EF 25-30%. She was treated for possible cellulitis of her LLE and UTI. UC 1/31/2024 with <10,000 mixed organisms. She tested positive for COVID-19 and was not treated. Losartan, torsemide and spironolactone were decreased. Jardiance discontinued due to UTI. Psychiatry consulted and ordered zyprexa prn for delirium. She returned to the facility 2/6/2024.   She was sent to the [facility] ED 2/7/2024 with unresponsive episode and BP 96/50. She was alert and interactive in the ED. Labs and VS were within range and she returned to the facility.     Today's concern is:      Dementia with psychosis (H)  Paranoia (H)  Hallucinations  HFrEF (heart failure with reduced ejection fraction) (H)  Chronic atrial fibrillation (H)  Type 2 diabetes mellitus with stage 3a chronic kidney disease, without long-term current use of insulin (H)  Rheumatoid arthritis with negative rheumatoid factor, involving unspecified site (H)  PVD (peripheral vascular disease) (H24)  Lymphedema  Yeast infection of the skin  She is a poor historian. Pleasant and talkative, more conversational than usual. Denies feeling ill. Denies pain. Staff reports paranoia and hallucinations have improved since seroquel was started last month. She's had 2 recent falls, no injury. Wheelchair bound and requires assist of 1 with transfers and cares.   Spoke with her  Madhu Weiss, who reports her mood has been \"fabulous\" since starting seroquel. She has been pleasant during their visits and less verbally abusive. Still paranoid and thinks people are poisoning her, but improved. He thinks she was hiding pills and not taking them, but has not recently found pills in her room.       Allergies, and PMH/PSH reviewed in EPIC today    REVIEW OF SYSTEMS:  Unable to obtain due to dementia. Positives as noted under HPI.       Objective:   /83   Pulse 88   Temp 98.4  F (36.9  C)   Resp " 18   Wt 89.8 kg (198 lb)   BMI 33.99 kg/m    GENERAL APPEARANCE:  Alert, in no distress  ENT:  [place], oropharynx clear  EYES:  conjunctiva and lids normal  RESP:  lungs clear to auscultation   CV:  irregular rate and rhythm, no murmur, peripheral edema 1-2+ in both LE  ABDOMEN:  soft, non-tender, no distension  M/S:  wheelchair. DICKERSON with appropriate strength. No joint inflammation   SKIN:  erythematous rash under left breast. Stasis changes both LE, skin intact   PSYCH:  oriented to self, insight and judgement impaired, memory impaired , affect and mood normal    Recent labs in [facility] reviewed by me today.     ASSESSMENT / PLAN:  (F03.92) Dementia with psychosis (H)  (primary encounter diagnosis)  (F22) Paranoia (H)  (R44.3) Hallucinations  Comment: mood and psychosis improved. Tolerating low dose seroquel   Plan: continue seroquel 12.5 mg at 5 pm, plus 12.5 mg daily prn-prn dose has been used X 1. Continue sertraline 50 mg daily. Memory Care staff assistance with cares, meals, activities, med admin   agrees with plan of care  Discussed with staff     (I50.20) HFrEF (heart failure with reduced ejection fraction) (H)  Comment: most recent weight is up 20 lbs and may not be accurate. She appears euvolemic   Losartan has been on hold due to low BPs. Recent readings improved: 131/83, 135/59, 133/91  Plan: discontinue losartan. Continue digoxin 125 mcg daily, metoprolol ER 25 mg daily, spironolactone 12.5 mg daily, torsemide 40 mg daily. Follow weight, symptoms     (I48.20) Chronic atrial fibrillation (H)  Comment: HR: 86-90  Digoxin level has been therapeutic-suspect she missed doses of meds   Plan: continue digoxin, metoprolol, apixaban. Recheck digoxin    (E11.22,  N18.31) Type 2 diabetes mellitus with stage 3a chronic kidney disease, without long-term current use of insulin (H)  Comment: blood glucose: 114-166, outliers 192, 212  Jardiance dc'd due to recurrent UTI, Trulicity dc'd due to hypoglycemia.   HgbA1c  6.7 on 10/19/2023  Plan: continue metformin. Monitor blood glucose     (M06.00) Rheumatoid arthritis with negative rheumatoid factor, involving unspecified site (H)  Comment: no acute issues. Last saw Rheumatology 5/2021  Plan: monitor     (I73.9) PVD (peripheral vascular disease) (H24)  (I89.0) Lymphedema  Comment: edema appears at baseline. She is not fully compliant with compression wraps   Plan: compression wraps and elevate legs as able. Torsemide as above     (B37.2) Yeast infection of the skin  Comment: rash under left breast  Plan: nystatin cream bid, keep area clean and dry         Electronically signed by: SHALONDA Kincaid CNP           Sincerely,        SHALONDA Kincaid CNP

## 2024-03-20 ENCOUNTER — LAB REQUISITION (OUTPATIENT)
Dept: LAB | Facility: CLINIC | Age: 74
End: 2024-03-20
Payer: COMMERCIAL

## 2024-03-20 DIAGNOSIS — E87.1 HYPO-OSMOLALITY AND HYPONATREMIA: ICD-10-CM

## 2024-03-20 DIAGNOSIS — I48.0 PAROXYSMAL ATRIAL FIBRILLATION (H): ICD-10-CM

## 2024-03-21 PROCEDURE — 36415 COLL VENOUS BLD VENIPUNCTURE: CPT | Mod: ORL | Performed by: NURSE PRACTITIONER

## 2024-03-21 PROCEDURE — P9603 ONE-WAY ALLOW PRORATED MILES: HCPCS | Mod: ORL | Performed by: NURSE PRACTITIONER

## 2024-03-21 PROCEDURE — 80162 ASSAY OF DIGOXIN TOTAL: CPT | Mod: ORL | Performed by: NURSE PRACTITIONER

## 2024-03-21 PROCEDURE — 80048 BASIC METABOLIC PNL TOTAL CA: CPT | Mod: ORL | Performed by: NURSE PRACTITIONER

## 2024-03-22 LAB
ANION GAP SERPL CALCULATED.3IONS-SCNC: 14 MMOL/L (ref 7–15)
BUN SERPL-MCNC: 28.3 MG/DL (ref 8–23)
CALCIUM SERPL-MCNC: 8.9 MG/DL (ref 8.8–10.2)
CHLORIDE SERPL-SCNC: 98 MMOL/L (ref 98–107)
CREAT SERPL-MCNC: 1.33 MG/DL (ref 0.51–0.95)
DEPRECATED HCO3 PLAS-SCNC: 22 MMOL/L (ref 22–29)
DIGOXIN SERPL-MCNC: 1.8 NG/ML (ref 0.6–2)
EGFRCR SERPLBLD CKD-EPI 2021: 42 ML/MIN/1.73M2
GLUCOSE SERPL-MCNC: 118 MG/DL (ref 70–99)
POTASSIUM SERPL-SCNC: 5.3 MMOL/L (ref 3.4–5.3)
SODIUM SERPL-SCNC: 134 MMOL/L (ref 135–145)

## 2024-04-01 NOTE — TELEPHONE ENCOUNTER
Resident had a repeat UA/UC done and UC came back negative - normal didier    Had a fall this morning.  Running low grade temps around 99.    Lab day is on Thursdays.  Otherwise would have ordered a CBC to check WBC.  Nursing thought she was not having respiratory distress.    Decision made to update NP that no orders today but can decide what to do as knows patient better than on call.    SHALONDA Tirado CNP    
Sweating Severity Scale: 2- The sweating is tolerable but sometimes interferes with daily activities
How Severe Is It?: moderate
Is This A New Presentation, Or A Follow-Up?: Sweating

## 2024-04-04 ENCOUNTER — ASSISTED LIVING VISIT (OUTPATIENT)
Dept: GERIATRICS | Facility: CLINIC | Age: 74
End: 2024-04-04
Payer: COMMERCIAL

## 2024-04-04 VITALS
DIASTOLIC BLOOD PRESSURE: 86 MMHG | SYSTOLIC BLOOD PRESSURE: 129 MMHG | WEIGHT: 187.8 LBS | HEART RATE: 84 BPM | BODY MASS INDEX: 32.24 KG/M2 | TEMPERATURE: 98.1 F | RESPIRATION RATE: 20 BRPM

## 2024-04-04 DIAGNOSIS — R44.3 HALLUCINATIONS: ICD-10-CM

## 2024-04-04 DIAGNOSIS — I50.20 HFREF (HEART FAILURE WITH REDUCED EJECTION FRACTION) (H): ICD-10-CM

## 2024-04-04 DIAGNOSIS — E87.5 HYPERKALEMIA: ICD-10-CM

## 2024-04-04 DIAGNOSIS — I89.0 LYMPHEDEMA: ICD-10-CM

## 2024-04-04 DIAGNOSIS — I73.9 PVD (PERIPHERAL VASCULAR DISEASE) (H): ICD-10-CM

## 2024-04-04 DIAGNOSIS — F22 PARANOIA (H): ICD-10-CM

## 2024-04-04 DIAGNOSIS — N18.31 TYPE 2 DIABETES MELLITUS WITH STAGE 3A CHRONIC KIDNEY DISEASE, WITHOUT LONG-TERM CURRENT USE OF INSULIN (H): ICD-10-CM

## 2024-04-04 DIAGNOSIS — I48.20 CHRONIC ATRIAL FIBRILLATION (H): ICD-10-CM

## 2024-04-04 DIAGNOSIS — E11.22 TYPE 2 DIABETES MELLITUS WITH STAGE 3A CHRONIC KIDNEY DISEASE, WITHOUT LONG-TERM CURRENT USE OF INSULIN (H): ICD-10-CM

## 2024-04-04 DIAGNOSIS — F03.92 DEMENTIA WITH PSYCHOSIS (H): Primary | ICD-10-CM

## 2024-04-04 PROCEDURE — 99349 HOME/RES VST EST MOD MDM 40: CPT | Performed by: NURSE PRACTITIONER

## 2024-04-04 NOTE — PROGRESS NOTES
[facility]    Chief Complaint   Patient presents with    RECHECK     HPI:  Rachel Weiss is a 73 year old  (1950), who is being seen today for an episodic care visit at: [facility] ([facility]) [[id number]].   She admitted to Memory Care at this facility 9/26/2023 for a higher level of care following hospitalization and tcu stay.   Medical history significant for vascular dementia, HFrEF, afib, CAD with STEMI and stent to LAD 11/2021, ischemic cardiomyopathy, PVD, lymphedema, diabetes type 2, CKD stage 3, HTN, asthma, rheumatoid arthritis, depression, anxiety.   She was hospitalized at [facility] 9/6-9/13/2023 with acute on chronic CHF and was found to be in afib with RVR. She was diuresed with lasix IV and discharged on  torsemide (increased from 60 mg daily to 60 mg in am and 40 mg in pm). Empagliflozin was started. Digoxin dose was increased and metoprolol continued for afib. She was diuresed for 14 lbs with weight 181 lbs at discharge. Estimated dry weight ~182 lbs. She discharged to [facility] TCU where she made fair progress in therapies. Torsemide was decreased to 40 mg bid and losartan decreased to 12.5 mg daily for hypotension and weight 177 lbs. She received cipro for UTI.   She was sent to the [facility] ED 11/28/2023 with acute onset of severe left hip pain. CT showed inflammation of the left iliopsoas muscle. She was unable to tolerate MRI. Ortho was concerned for occult fracture and plan was for MRI under general anesthesia, which was cancelled by Anesthesia due to potential risk. Pain spontaneously resolved and she was able to participate in therapies. Losartan was increased back to 25 mg daily. She returned to the facility 12/5/2023.   She was sent to the [facility] ED 1/30/2024 with an unresponsive episode. EMS reported seizure like activity lasting 10-15 mins. No seizure history. Video EEG was negative. CT head negative for acute pathology. ECHO: EF 25-30%. She was treated for possible  "cellulitis of her LLE and UTI. UC 1/31/2024 with <10,000 mixed organisms. She tested positive for COVID-19 and was not treated. Losartan, torsemide and spironolactone were decreased. Jardiance discontinued due to UTI. Psychiatry consulted and ordered zyprexa prn for delirium. She returned to the facility 2/6/2024.   She was sent to the [facility] ED 2/7/2024 with unresponsive episode and BP 96/50. She was alert and interactive in the ED. Labs and VS were within range and she returned to the facility.        Today's concern is:      Dementia with psychosis (H)  Paranoia (H)  Hallucinations  PVD (peripheral vascular disease) (H24)  Lymphedema  HFrEF (heart failure with reduced ejection fraction) (H)  Chronic atrial fibrillation (H)  Type 2 diabetes mellitus with stage 3a chronic kidney disease, without long-term current use of insulin (H)  Hyperkalemia  She is unable to provide history. Pleasantly confused, not as talkative as usual. Denies feeling ill. Denies pain. \"I'm ok.\" Wheelchair bound and requires assist of 1 with all cares. Staff reports she's requiring more assistance with transfers and bed mobility, which is difficult in her current bed. She has been more compliant with wearing her compression wraps.   Spoke with her  Madhu Weiss, who reports she was talkative and her usual self during his recent visit. Mood and paranoia have improved.     Allergies, and PMH/PSH reviewed in EPIC today    REVIEW OF SYSTEMS:  Unable to obtain due to dementia. Positives as noted under HPI.       Objective:   /86   Pulse 84   Temp 98.1  F (36.7  C)   Resp 20   Wt 85.2 kg (187 lb 12.8 oz)   BMI 32.24 kg/m    GENERAL APPEARANCE:  Alert, in no distress  ENT:  [place], oropharynx clear  EYES:  conjunctiva and lids normal  RESP:  lungs clear to auscultation   CV:  irregular rate and rhythm, no murmur, peripheral edema 1+ in both LE  ABDOMEN:  soft, non-tender, no distension  M/S:  wheelchair. DICKERSON with appropriate strength. " No joint inflammation   SKIN:  no visible rashes or open areas   PSYCH:  oriented to self, insight and judgement impaired, memory impaired , affect and mood normal    Recent labs in [facility] reviewed by me today.     ASSESSMENT / PLAN:  (F03.92) Dementia with psychosis (H)  (primary encounter diagnosis)  (F22) Paranoia (H)  (R44.3) Hallucinations  Comment: decline in both cognition and functional status, requiring more assistance with transfers and bed mobility. Mood and psychosis improved with low dose seroquel  Plan: continue seroquel, sertraline.Memory Care staff assistance with cares, meals, activities, med admin. Documentation completed for hospital bed    agrees with plan of care    (I73.9) PVD (peripheral vascular disease) (H24)  (I89.0) Lymphedema  Comment: chronic. Intermittently compliant with wearing compression wraps   Plan: continue compression wraps, elevate legs when able. Continue torsemide     (I50.20) HFrEF (heart failure with reduced ejection fraction) (H)  Comment: appears euvolemic.   Losartan dc'd due to soft BPs  Plan: continue torsemide, digoxin, metoprolol, spironolactone. Follow weight, symptoms     (I48.20) Chronic atrial fibrillation (H)  Comment: rate controlled: 84-88  Digoxin level 1.8 on 3/21/2024  Plan: continue apixaban, digoxin, metoprolol.     (E11.22,  N18.31) Type 2 diabetes mellitus with stage 3a chronic kidney disease, without long-term current use of insulin (H)  Comment: blood glucose: 101-192  Jardiance dc'd due to recurrent UTI, Trulicity dc'd due to hypoglycemia.   HgbA1c 6.7 on 10/19/2023  Plan: continue metformin     (E87.5) Hyperkalemia  Comment: resolved with K 5.3 on 3/21/2024  Kcl dose decreased last month, spironolactone continued.   Plan: continue Kcl 20 meq daily. BMP        Electronically signed by: SHALONDA Kincaid CNP

## 2024-04-04 NOTE — LETTER
4/4/2024        RE: Rachel Weiss  [facility]   [address]  [facility] [address]        [facility] GERIATRICS    Chief Complaint   Patient presents with     RECHECK     HPI:  Rachel Weiss is a 73 year old  (1950), who is being seen today for an episodic care visit at: [facility] ([facility]) [[id number]].   She admitted to Memory Care at this facility 9/26/2023 for a higher level of care following hospitalization and tcu stay.   Medical history significant for vascular dementia, HFrEF, afib, CAD with STEMI and stent to LAD 11/2021, ischemic cardiomyopathy, PVD, lymphedema, diabetes type 2, CKD stage 3, HTN, asthma, rheumatoid arthritis, depression, anxiety.   She was hospitalized at [facility] 9/6-9/13/2023 with acute on chronic CHF and was found to be in afib with RVR. She was diuresed with lasix IV and discharged on  torsemide (increased from 60 mg daily to 60 mg in am and 40 mg in pm). Empagliflozin was started. Digoxin dose was increased and metoprolol continued for afib. She was diuresed for 14 lbs with weight 181 lbs at discharge. Estimated dry weight ~182 lbs. She discharged to [facility] TCU where she made fair progress in therapies. Torsemide was decreased to 40 mg bid and losartan decreased to 12.5 mg daily for hypotension and weight 177 lbs. She received cipro for UTI.   She was sent to the [facility] ED 11/28/2023 with acute onset of severe left hip pain. CT showed inflammation of the left iliopsoas muscle. She was unable to tolerate MRI. Ortho was concerned for occult fracture and plan was for MRI under general anesthesia, which was cancelled by Anesthesia due to potential risk. Pain spontaneously resolved and she was able to participate in therapies. Losartan was increased back to 25 mg daily. She returned to the facility 12/5/2023.   She was sent to the [facility] ED 1/30/2024 with an unresponsive episode. EMS reported seizure like activity lasting 10-15 mins. No  "seizure history. Video EEG was negative. CT head negative for acute pathology. ECHO: EF 25-30%. She was treated for possible cellulitis of her LLE and UTI. UC 1/31/2024 with <10,000 mixed organisms. She tested positive for COVID-19 and was not treated. Losartan, torsemide and spironolactone were decreased. Jardiance discontinued due to UTI. Psychiatry consulted and ordered zyprexa prn for delirium. She returned to the facility 2/6/2024.   She was sent to the [facility] ED 2/7/2024 with unresponsive episode and BP 96/50. She was alert and interactive in the ED. Labs and VS were within range and she returned to the facility.        Today's concern is:      Dementia with psychosis (H)  Paranoia (H)  Hallucinations  PVD (peripheral vascular disease) (H24)  Lymphedema  HFrEF (heart failure with reduced ejection fraction) (H)  Chronic atrial fibrillation (H)  Type 2 diabetes mellitus with stage 3a chronic kidney disease, without long-term current use of insulin (H)  Hyperkalemia  She is unable to provide history. Pleasantly confused, not as talkative as usual. Denies feeling ill. Denies pain. \"I'm ok.\" Wheelchair bound and requires assist of 1 with all cares. Staff reports she's requiring more assistance with transfers and bed mobility, which is difficult in her current bed. She has been more compliant with wearing her compression wraps.   Spoke with her  Madhu Weiss, who reports she was talkative and her usual self during his recent visit. Mood and paranoia have improved.     Allergies, and PMH/PSH reviewed in EPIC today    REVIEW OF SYSTEMS:  Unable to obtain due to dementia. Positives as noted under HPI.       Objective:   /86   Pulse 84   Temp 98.1  F (36.7  C)   Resp 20   Wt 85.2 kg (187 lb 12.8 oz)   BMI 32.24 kg/m    GENERAL APPEARANCE:  Alert, in no distress  ENT:  [place], oropharynx clear  EYES:  conjunctiva and lids normal  RESP:  lungs clear to auscultation   CV:  irregular rate and rhythm, no " murmur, peripheral edema 1+ in both LE  ABDOMEN:  soft, non-tender, no distension  M/S:  wheelchair. DICKERSON with appropriate strength. No joint inflammation   SKIN:  no visible rashes or open areas   PSYCH:  oriented to self, insight and judgement impaired, memory impaired , affect and mood normal    Recent labs in [facility] reviewed by me today.     ASSESSMENT / PLAN:  (F03.92) Dementia with psychosis (H)  (primary encounter diagnosis)  (F22) Paranoia (H)  (R44.3) Hallucinations  Comment: decline in both cognition and functional status, requiring more assistance with transfers and bed mobility. Mood and psychosis improved with low dose seroquel  Plan: continue seroquel, sertraline.Memory Care staff assistance with cares, meals, activities, med admin. Documentation completed for hospital bed    agrees with plan of care    (I73.9) PVD (peripheral vascular disease) (H24)  (I89.0) Lymphedema  Comment: chronic. Intermittently compliant with wearing compression wraps   Plan: continue compression wraps, elevate legs when able. Continue torsemide     (I50.20) HFrEF (heart failure with reduced ejection fraction) (H)  Comment: appears euvolemic.   Losartan dc'd due to soft BPs  Plan: continue torsemide, digoxin, metoprolol, spironolactone. Follow weight, symptoms     (I48.20) Chronic atrial fibrillation (H)  Comment: rate controlled: 84-88  Digoxin level 1.8 on 3/21/2024  Plan: continue apixaban, digoxin, metoprolol.     (E11.22,  N18.31) Type 2 diabetes mellitus with stage 3a chronic kidney disease, without long-term current use of insulin (H)  Comment: blood glucose: 101-192  Jardiance dc'd due to recurrent UTI, Trulicity dc'd due to hypoglycemia.   HgbA1c 6.7 on 10/19/2023  Plan: continue metformin     (E87.5) Hyperkalemia  Comment: resolved with K 5.3 on 3/21/2024  Kcl dose decreased last month, spironolactone continued.   Plan: continue Kcl 20 meq daily. BMP        Electronically signed by: SHALONDA Kincaid CNP            Face to Face and Medical Necessity Statement for DME Provider visit    Demographic Information on Rachel Weiss:  Gender: female  : 1950  [facility]   [address] [place] [address]  [phone] (home)     Medical Record: [id number]  Social Security Number: [id number]  Primary Care Provider: Jeffrey Thapa  Insurance: Payor: [employer] / Plan: [facility] MEDICARE ADVANTAGE / Product Type: HMO /     HPI:   Rachel Weiss is a 73 year old  (1950), who is being seen today for a face to face provider visit at [facility]; medical necessity statement for DME included. This patient requires the following:  DME Ordered and Medical Necessity Statement   The patient does  require positioning of the body in ways not feasible with an ordinary bed due to a medical condition that is expected to last at least 1 month due to dementia, CHF, lymphedema.   The patient does  require, for the alleviation of pain, postioning of the body in ways not feasible with an ordinary bed.   The patient does  require the head of bed elevated more than 30* most of the time due to CHF, and aspiration risk   The patient does not require traction that can only be attached to a hospital bed.  The patient does  require a bed height different than a fixed height hospital bed to permit tranfers to wheelchair or standing position.   The patient does  require frequent or immediate changes in body position due to lymphedema.    Patient requires frequent body repositioning not feasible in an ordinary bed to alleviate pain, requires head of bed to be elevated more than 30 degrees due to CHF and aspiration risk, as well as to facilitate transfers from bed to wheelchair.       Pt needing above DME with expected length of need of 99  due to medical necessity associated with following diagnosis:     Dementia with psychosis (H)  Paranoia  (H)  Hallucinations  PVD (peripheral vascular disease) (H24)  Lymphedema  HFrEF (heart failure with reduced ejection fraction) (H)  Chronic atrial fibrillation (H)  Type 2 diabetes mellitus with stage 3a chronic kidney disease, without long-term current use of insulin (H)  Hyperkalemia      PMH   has a past medical history of A-fib (H), Arthritis, CAD (coronary artery disease), CKD (chronic kidney disease) stage 3, GFR 30-59 ml/min (H), Depression with anxiety, Diabetes mellitus, type 2 (H), Diverticulitis, HFrEF (heart failure with reduced ejection fraction) (H), HTN (hypertension), Ischemic cardiomyopathy, Lymphedema, NSTEMI (non-ST elevated myocardial infarction) (H) (11/2021), PVD (peripheral vascular disease) (H24), RA (rheumatoid arthritis) (H), Severe tricuspid regurgitation, Shingles (2006), Umbilical hernia without obstruction and without gangrene, Uncomplicated asthma, and Vascular dementia (H).    ROS:  Unable to obtain due to dementia. Positives as noted under HPI.       EXAM  Vitals: /86   Pulse 84   Temp 98.1  F (36.7  C)   Resp 20   Wt 85.2 kg (187 lb 12.8 oz)   BMI 32.24 kg/m  ;BMI= Body mass index is 32.24 kg/m .  GENERAL APPEARANCE:  Alert, in no distress  ENT:  [place], oropharynx clear  EYES:  conjunctiva and lids normal  RESP:  lungs clear to auscultation   CV:  irregular rate and rhythm, no murmur, peripheral edema 1+ in both LE  ABDOMEN:  soft, non-tender, no distension  M/S:  wheelchair. DICKERSON with appropriate strength. No joint inflammation   SKIN:  no visible rashes or open areas   PSYCH:  oriented to self, insight and judgement impaired, memory impaired , affect and mood normal    ASSESSMENT/PLAN:  1. Dementia with psychosis (H)    2. Paranoia (H)    3. Hallucinations    4. PVD (peripheral vascular disease) (H24)    5. Lymphedema    6. HFrEF (heart failure with reduced ejection fraction) (H)    7. Chronic atrial fibrillation (H)    8. Type 2 diabetes mellitus with stage 3a chronic  kidney disease, without long-term current use of insulin (H)    9. Hyperkalemia        Orders:  1. Facility staff/TC to contact [employer] to get their order form for provider to fill out    ELECTRONICALLY SIGNED BY ISABELA CERTIFIED PROVIDER:  SHALONDA Kincaid CNP   NPI: [id number]  [facility]  [address] [place] [address]            Sincerely,        SHALONDA Kincaid CNP

## 2024-04-06 NOTE — PROGRESS NOTES
Face to Face and Medical Necessity Statement for DME Provider visit    Demographic Information on Rachel Weiss:  Gender: female  : 1950  [facility]   [address] [facility] [address]  [phone] (home)     Medical Record: [id number]  Social Security Number: [id number]  Primary Care Provider: Jeffrey Thapa  Insurance: Payor: [employer] / Plan: [facility] MEDICARE ADVANTAGE / Product Type: HMO /     HPI:   Rachel Weiss is a 73 year old  (1950), who is being seen today for a face to face provider visit at [facility]; medical necessity statement for DME included. This patient requires the following:  DME Ordered and Medical Necessity Statement   The patient does  require positioning of the body in ways not feasible with an ordinary bed due to a medical condition that is expected to last at least 1 month due to dementia, CHF, lymphedema.   The patient does  require, for the alleviation of pain, postioning of the body in ways not feasible with an ordinary bed.   The patient does  require the head of bed elevated more than 30* most of the time due to CHF, and aspiration risk   The patient does not require traction that can only be attached to a hospital bed.  The patient does  require a bed height different than a fixed height hospital bed to permit tranfers to wheelchair or standing position.   The patient does  require frequent or immediate changes in body position due to lymphedema.    Patient requires frequent body repositioning not feasible in an ordinary bed to alleviate pain, requires head of bed to be elevated more than 30 degrees due to CHF and aspiration risk, as well as to facilitate transfers from bed to wheelchair.       Pt needing above DME with expected length of need of 99  due to medical necessity associated with following diagnosis:     Dementia with psychosis (H)  Paranoia (H)  Hallucinations  PVD  (peripheral vascular disease) (H24)  Lymphedema  HFrEF (heart failure with reduced ejection fraction) (H)  Chronic atrial fibrillation (H)  Type 2 diabetes mellitus with stage 3a chronic kidney disease, without long-term current use of insulin (H)  Hyperkalemia      PMH   has a past medical history of A-fib (H), Arthritis, CAD (coronary artery disease), CKD (chronic kidney disease) stage 3, GFR 30-59 ml/min (H), Depression with anxiety, Diabetes mellitus, type 2 (H), Diverticulitis, HFrEF (heart failure with reduced ejection fraction) (H), HTN (hypertension), Ischemic cardiomyopathy, Lymphedema, NSTEMI (non-ST elevated myocardial infarction) (H) (11/2021), PVD (peripheral vascular disease) (H24), RA (rheumatoid arthritis) (H), Severe tricuspid regurgitation, Shingles (2006), Umbilical hernia without obstruction and without gangrene, Uncomplicated asthma, and Vascular dementia (H).    ROS:  Unable to obtain due to dementia. Positives as noted under HPI.       EXAM  Vitals: /86   Pulse 84   Temp 98.1  F (36.7  C)   Resp 20   Wt 85.2 kg (187 lb 12.8 oz)   BMI 32.24 kg/m  ;BMI= Body mass index is 32.24 kg/m .  GENERAL APPEARANCE:  Alert, in no distress  ENT:  [place], oropharynx clear  EYES:  conjunctiva and lids normal  RESP:  lungs clear to auscultation   CV:  irregular rate and rhythm, no murmur, peripheral edema 1+ in both LE  ABDOMEN:  soft, non-tender, no distension  M/S:  wheelchair. DICKERSON with appropriate strength. No joint inflammation   SKIN:  no visible rashes or open areas   PSYCH:  oriented to self, insight and judgement impaired, memory impaired , affect and mood normal    ASSESSMENT/PLAN:  1. Dementia with psychosis (H)    2. Paranoia (H)    3. Hallucinations    4. PVD (peripheral vascular disease) (H24)    5. Lymphedema    6. HFrEF (heart failure with reduced ejection fraction) (H)    7. Chronic atrial fibrillation (H)    8. Type 2 diabetes mellitus with stage 3a chronic kidney disease, without  long-term current use of insulin (H)    9. Hyperkalemia        Orders:  1. Facility staff/TC to contact DME company to get their order form for provider to fill out    ELECTRONICALLY SIGNED BY ISABELA CERTIFIED PROVIDER:  SHALONDA Kincaid CNP   NPI: [id number]  [facility]  [address] [place] [id number]

## 2024-04-10 ENCOUNTER — LAB REQUISITION (OUTPATIENT)
Dept: LAB | Facility: CLINIC | Age: 74
End: 2024-04-10
Payer: COMMERCIAL

## 2024-04-10 DIAGNOSIS — E87.1 HYPO-OSMOLALITY AND HYPONATREMIA: ICD-10-CM

## 2024-04-11 LAB
ANION GAP SERPL CALCULATED.3IONS-SCNC: 16 MMOL/L (ref 7–15)
BUN SERPL-MCNC: 20.7 MG/DL (ref 8–23)
CALCIUM SERPL-MCNC: 9.2 MG/DL (ref 8.8–10.2)
CHLORIDE SERPL-SCNC: 98 MMOL/L (ref 98–107)
CREAT SERPL-MCNC: 1.26 MG/DL (ref 0.51–0.95)
DEPRECATED HCO3 PLAS-SCNC: 25 MMOL/L (ref 22–29)
EGFRCR SERPLBLD CKD-EPI 2021: 44 ML/MIN/1.73M2
GLUCOSE SERPL-MCNC: 114 MG/DL (ref 70–99)
POTASSIUM SERPL-SCNC: 4.2 MMOL/L (ref 3.4–5.3)
SODIUM SERPL-SCNC: 139 MMOL/L (ref 135–145)

## 2024-04-11 PROCEDURE — 80048 BASIC METABOLIC PNL TOTAL CA: CPT | Mod: ORL | Performed by: NURSE PRACTITIONER

## 2024-04-11 PROCEDURE — P9604 ONE-WAY ALLOW PRORATED TRIP: HCPCS | Mod: ORL | Performed by: NURSE PRACTITIONER

## 2024-04-11 PROCEDURE — 36415 COLL VENOUS BLD VENIPUNCTURE: CPT | Mod: ORL | Performed by: NURSE PRACTITIONER

## 2024-04-19 ENCOUNTER — TELEPHONE (OUTPATIENT)
Dept: GERIATRICS | Facility: CLINIC | Age: 74
End: 2024-04-19
Payer: COMMERCIAL

## 2024-04-19 DIAGNOSIS — B37.2 YEAST INFECTION OF THE SKIN: Primary | ICD-10-CM

## 2024-04-19 RX ORDER — MICONAZOLE NITRATE 20 MG/G
CREAM TOPICAL 2 TIMES DAILY
Qty: 198 G | Refills: 11 | Status: SHIPPED | OUTPATIENT
Start: 2024-04-19 | End: 2024-07-31

## 2024-04-19 RX ORDER — FLUCONAZOLE 150 MG/1
150 TABLET ORAL DAILY
Qty: 3 TABLET | Refills: 0 | Status: SHIPPED | OUTPATIENT
Start: 2024-04-19 | End: 2024-04-22

## 2024-04-19 RX ORDER — MICONAZOLE NITRATE 20 MG/G
CREAM TOPICAL 2 TIMES DAILY
Qty: 198 G | Refills: 11 | Status: SHIPPED | OUTPATIENT
Start: 2024-04-19 | End: 2024-04-19

## 2024-04-19 NOTE — TELEPHONE ENCOUNTER
Rachel Weiss   1950    Orders 2024:    Diflucan 150 mg po daily X 3 days for yeast infection of the skin  Radha antifungal cream, apply to reddened groin, buttocks and qiana area bid for yeast infection. Apply after incontinent episodes in between nystatin cream  DISCONTINUE current Radha order  Sent to pharmacy      SHALONDA Kincaid CNP on 2024 at 7:51 AM

## 2024-04-24 NOTE — PROGRESS NOTES
[facility] GERIATRICS    Chief Complaint   Patient presents with    RECHECK     HPI:  Rachel Weiss is a 73 year old  (1950), who is being seen today for an episodic care visit at: THE [facility] ([facility]) [[id number]].   She admitted to Memory Care at this facility 9/26/2023 for a higher level of care following hospitalization and tcu stay.   Medical history significant for vascular dementia, HFrEF, afib, CAD with STEMI and stent to LAD 11/2021, ischemic cardiomyopathy, PVD, lymphedema, diabetes type 2, CKD stage 3, HTN, asthma, rheumatoid arthritis, depression, anxiety.   She was hospitalized at [facility] 9/6-9/13/2023 with acute on chronic CHF and was found to be in afib with RVR. She was diuresed with lasix IV and discharged on torsemide (increased from 60 mg daily to 60 mg in am and 40 mg in pm). Empagliflozin was started. Digoxin dose was increased and metoprolol continued for afib. She was diuresed for 14 lbs with weight 181 lbs at discharge. Estimated dry weight ~182 lbs. She discharged to [facility] where she made fair progress in therapies. Torsemide was decreased to 40 mg bid and losartan decreased to 12.5 mg daily for hypotension and weight 177 lbs. She received cipro for UTI.   She was sent to the [facility] [place] ED 11/28/2023 with acute onset of severe left hip pain. CT showed inflammation of the left iliopsoas muscle. She was unable to tolerate MRI. Ortho was concerned for occult fracture and plan was for MRI under general anesthesia, which was cancelled by Anesthesia due to potential risk. Pain spontaneously resolved and she was able to participate in therapies. Losartan was increased back to 25 mg daily. She returned to the facility 12/5/2023.   She was sent to the [facility] [place] ED 1/30/2024 with an unresponsive episode. EMS reported seizure like activity lasting 10-15 mins. No seizure history. Video EEG was negative. CT head negative for acute pathology. ECHO: EF 25-30%. She was treated for possible  cellulitis of her LLE and UTI. UC 1/31/2024 with <10,000 mixed organisms. She tested positive for COVID-19 and was not treated. Losartan, torsemide and spironolactone were decreased. Jardiance discontinued due to UTI. Psychiatry consulted and ordered zyprexa prn delirium. She returned to the facility 2/6/2024.   She was sent to the [facility] ED 2/7/2024 with unresponsive episode and BP 96/50. She was alert and interactive in the ED. Labs and VS were within range and she returned to the facility.        Today's concern is:      Dementia with psychosis (H)  Paranoia (H)  Hallucinations  Yeast infection of the skin  PVD (peripheral vascular disease) (H24)  Lymphedema  HFrEF (heart failure with reduced ejection fraction) (H)  Chronic atrial fibrillation (H)  Type 2 diabetes mellitus with stage 3a chronic kidney disease, without long-term current use of insulin (H)  She is unable to provide history. Pleasant and interactive, does not respond to questions appropriately. Diflucan was given X 3 days, last dose 4/22/2024 for yeast infection of buttocks and qiana area. Radha antifungal cream was also started. Staff reports her skin has improved and no open areas. Wheelchair bound and sujit is used for transfers. Requires max assist with cares, able to feed herself with set up and supervision. Staff reports she's been pleasant and cooperative, no significant delusions. She now has a hospital bed and is able to elevate her legs, LE edema has improved.   Spoke with her  Madhu, who agrees that her mood and paranoia have improved. He reports her appetite is down, no signs of swallow problems.       Allergies, and PMH/PSH reviewed in EPIC today    REVIEW OF SYSTEMS:  Unable to obtain due to dementia. Positives as noted under HPI.       Objective:   BP (!) 146/66   Pulse 67   Temp 97  F (36.1  C)   Resp 17   GENERAL APPEARANCE:  Alert, in no distress  ENT:  [place], oropharynx clear  EYES:  conjunctiva and lids normal  RESP:  lungs  "clear to auscultation   CV:  irregular rate and rhythm, no murmur, peripheral edema 1+in both LE  ABDOMEN:  soft, non-tender, no distension  M/S:  wheelchair. DICKERSON with appropriate strength. No joint inflammation   SKIN:  mild erythema of groin folds, skin intact   PSYCH:  oriented to self, insight and judgement impaired, memory impaired , affect and mood normal    Recent labs in [facility] reviewed by me today.     ASSESSMENT / PLAN:  (F03.92) Dementia with psychosis (H)  (primary encounter diagnosis)  (F22) Paranoia (H)  (R44.3) Hallucinations  Comment: severe deficits, low functional status.  and I discussed trying home therapies again and he declines at this time, \"she's content.\"   Plan: continue seroquel, sertraline. Memory Care staff assistance with cares, meals, activities, med admin    (B37.2) Yeast infection of the skin  Comment: improved, skin intact   Plan: continue Radha antifungal cream, keep skin as clean and dry as possible     (I73.9) PVD (peripheral vascular disease) (H24)  (I89.0) Lymphedema  Comment: LE edema improved. She's been more compliant with compression wraps and elevating legs.   Plan: continue torsemide. Compression, elevate legs when in bed     (I50.20) HFrEF (heart failure with reduced ejection fraction) (H)  Comment: appears euvolemic  Losartan was dc'd due to soft BPs  Plan: continue torsemide, digoxin, metoprolol, spironolactone. BMP. Follow weight, symptoms     (I48.20) Chronic atrial fibrillation (H)  Comment: rate controlled: 67-72  Plan: continue apixaban, digoxin, metoprolol     (E11.22,  N18.31) Type 2 diabetes mellitus with stage 3a chronic kidney disease, without long-term current use of insulin (H)  Comment: blood glucose:   Jardiance dc'd due to recurrent UTI, Trulicity dc'd due to hypoglycemia.   HgbA1c 6.7 on 10/19/2023  Plan: continue metformin. HgbA1c        Electronically signed by: SHALONDA Kincaid CNP       "

## 2024-04-25 ENCOUNTER — ASSISTED LIVING VISIT (OUTPATIENT)
Dept: GERIATRICS | Facility: CLINIC | Age: 74
End: 2024-04-25
Payer: COMMERCIAL

## 2024-04-25 DIAGNOSIS — F03.92 DEMENTIA WITH PSYCHOSIS (H): Primary | ICD-10-CM

## 2024-04-25 DIAGNOSIS — N18.31 TYPE 2 DIABETES MELLITUS WITH STAGE 3A CHRONIC KIDNEY DISEASE, WITHOUT LONG-TERM CURRENT USE OF INSULIN (H): ICD-10-CM

## 2024-04-25 DIAGNOSIS — B37.2 YEAST INFECTION OF THE SKIN: ICD-10-CM

## 2024-04-25 DIAGNOSIS — I48.20 CHRONIC ATRIAL FIBRILLATION (H): ICD-10-CM

## 2024-04-25 DIAGNOSIS — F22 PARANOIA (H): ICD-10-CM

## 2024-04-25 DIAGNOSIS — I73.9 PVD (PERIPHERAL VASCULAR DISEASE) (H): ICD-10-CM

## 2024-04-25 DIAGNOSIS — I50.20 HFREF (HEART FAILURE WITH REDUCED EJECTION FRACTION) (H): ICD-10-CM

## 2024-04-25 DIAGNOSIS — E11.22 TYPE 2 DIABETES MELLITUS WITH STAGE 3A CHRONIC KIDNEY DISEASE, WITHOUT LONG-TERM CURRENT USE OF INSULIN (H): ICD-10-CM

## 2024-04-25 DIAGNOSIS — R44.3 HALLUCINATIONS: ICD-10-CM

## 2024-04-25 DIAGNOSIS — I89.0 LYMPHEDEMA: ICD-10-CM

## 2024-04-25 PROCEDURE — 99349 HOME/RES VST EST MOD MDM 40: CPT | Performed by: NURSE PRACTITIONER

## 2024-04-25 NOTE — LETTER
4/25/2024        RE: Rachel Weiss  [facility]   [address]  [facility] [id number]        [facility] GERIATRICS    Chief Complaint   Patient presents with     RECHECK     HPI:  Rachel Weiss is a 73 year old  (1950), who is being seen today for an episodic care visit at: THE [facility] ([facility]) [[id number]].   She admitted to Memory Care at this facility 9/26/2023 for a higher level of care following hospitalization and tcu stay.   Medical history significant for vascular dementia, HFrEF, afib, CAD with STEMI and stent to LAD 11/2021, ischemic cardiomyopathy, PVD, lymphedema, diabetes type 2, CKD stage 3, HTN, asthma, rheumatoid arthritis, depression, anxiety.   She was hospitalized at [facility] 9/6-9/13/2023 with acute on chronic CHF and was found to be in afib with RVR. She was diuresed with lasix IV and discharged on torsemide (increased from 60 mg daily to 60 mg in am and 40 mg in pm). Empagliflozin was started. Digoxin dose was increased and metoprolol continued for afib. She was diuresed for 14 lbs with weight 181 lbs at discharge. Estimated dry weight ~182 lbs. She discharged to [facility] where she made fair progress in therapies. Torsemide was decreased to 40 mg bid and losartan decreased to 12.5 mg daily for hypotension and weight 177 lbs. She received cipro for UTI.   She was sent to the [facility] ED 11/28/2023 with acute onset of severe left hip pain. CT showed inflammation of the left iliopsoas muscle. She was unable to tolerate MRI. Ortho was concerned for occult fracture and plan was for MRI under general anesthesia, which was cancelled by Anesthesia due to potential risk. Pain spontaneously resolved and she was able to participate in therapies. Losartan was increased back to 25 mg daily. She returned to the facility 12/5/2023.   She was sent to the [facility] ED 1/30/2024 with an unresponsive episode. EMS reported seizure like activity lasting 10-15 mins. No  seizure history. Video EEG was negative. CT head negative for acute pathology. ECHO: EF 25-30%. She was treated for possible cellulitis of her LLE and UTI. UC 1/31/2024 with <10,000 mixed organisms. She tested positive for COVID-19 and was not treated. Losartan, torsemide and spironolactone were decreased. Jardiance discontinued due to UTI. Psychiatry consulted and ordered zyprexa prn delirium. She returned to the facility 2/6/2024.   She was sent to the [facility] ED 2/7/2024 with unresponsive episode and BP 96/50. She was alert and interactive in the ED. Labs and VS were within range and she returned to the facility.        Today's concern is:      Dementia with psychosis (H)  Paranoia (H)  Hallucinations  Yeast infection of the skin  PVD (peripheral vascular disease) (H24)  Lymphedema  HFrEF (heart failure with reduced ejection fraction) (H)  Chronic atrial fibrillation (H)  Type 2 diabetes mellitus with stage 3a chronic kidney disease, without long-term current use of insulin (H)  She is unable to provide history. Pleasant and interactive, does not respond to questions appropriately. Diflucan was given X 3 days, last dose 4/22/2024 for yeast infection of buttocks and qiana area. Radha antifungal cream was also started. Staff reports her skin has improved and no open areas. Wheelchair bound and sujit is used for transfers. Requires max assist with cares, able to feed herself with set up and supervision. Staff reports she's been pleasant and cooperative, no significant delusions. She now has a hospital bed and is able to elevate her legs, LE edema has improved.   Spoke with her  Madhu, who agrees that her mood and paranoia have improved. He reports her appetite is down, no signs of swallow problems.       Allergies, and PMH/PSH reviewed in EPIC today    REVIEW OF SYSTEMS:  Unable to obtain due to dementia. Positives as noted under HPI.       Objective:   BP (!) 146/66   Pulse 67   Temp 97  F (36.1  C)   Resp 17  "  GENERAL APPEARANCE:  Alert, in no distress  ENT:  [place], oropharynx clear  EYES:  conjunctiva and lids normal  RESP:  lungs clear to auscultation   CV:  irregular rate and rhythm, no murmur, peripheral edema 1+in both LE  ABDOMEN:  soft, non-tender, no distension  M/S:  wheelchair. DICKERSON with appropriate strength. No joint inflammation   SKIN:  mild erythema of groin folds, skin intact   PSYCH:  oriented to self, insight and judgement impaired, memory impaired , affect and mood normal    Recent labs in [facility] reviewed by me today.     ASSESSMENT / PLAN:  (F03.92) Dementia with psychosis (H)  (primary encounter diagnosis)  (F22) Paranoia (H)  (R44.3) Hallucinations  Comment: severe deficits, low functional status.  and I discussed trying home therapies again and he declines at this time, \"she's content.\"   Plan: continue seroquel, sertraline. Memory Care staff assistance with cares, meals, activities, med admin    (B37.2) Yeast infection of the skin  Comment: improved, skin intact   Plan: continue Radha antifungal cream, keep skin as clean and dry as possible     (I73.9) PVD (peripheral vascular disease) (H24)  (I89.0) Lymphedema  Comment: LE edema improved. She's been more compliant with compression wraps and elevating legs.   Plan: continue torsemide. Compression, elevate legs when in bed     (I50.20) HFrEF (heart failure with reduced ejection fraction) (H)  Comment: appears euvolemic  Losartan was dc'd due to soft BPs  Plan: continue torsemide, digoxin, metoprolol, spironolactone. BMP. Follow weight, symptoms     (I48.20) Chronic atrial fibrillation (H)  Comment: rate controlled: 67-72  Plan: continue apixaban, digoxin, metoprolol     (E11.22,  N18.31) Type 2 diabetes mellitus with stage 3a chronic kidney disease, without long-term current use of insulin (H)  Comment: blood glucose:   Jardiance dc'd due to recurrent UTI, Trulicity dc'd due to hypoglycemia.   HgbA1c 6.7 on 10/19/2023  Plan: continue " metformin. HgbA1c        Electronically signed by: SHALONDA Kincaid CNP           Sincerely,        SHALONDA Kincaid CNP

## 2024-04-26 RX ORDER — NYSTATIN 100000 U/G
CREAM TOPICAL
Qty: 30 G | Refills: 11 | Status: SHIPPED | OUTPATIENT
Start: 2024-04-26 | End: 2024-06-13 | Stop reason: ALTCHOICE

## 2024-05-08 ENCOUNTER — LAB REQUISITION (OUTPATIENT)
Dept: LAB | Facility: CLINIC | Age: 74
End: 2024-05-08
Payer: COMMERCIAL

## 2024-05-08 DIAGNOSIS — E11.8 TYPE 2 DIABETES MELLITUS WITH UNSPECIFIED COMPLICATIONS (H): ICD-10-CM

## 2024-05-09 LAB
ANION GAP SERPL CALCULATED.3IONS-SCNC: 15 MMOL/L (ref 7–15)
BUN SERPL-MCNC: 23.5 MG/DL (ref 8–23)
CALCIUM SERPL-MCNC: 9.6 MG/DL (ref 8.8–10.2)
CHLORIDE SERPL-SCNC: 95 MMOL/L (ref 98–107)
CREAT SERPL-MCNC: 0.86 MG/DL (ref 0.51–0.95)
DEPRECATED HCO3 PLAS-SCNC: 25 MMOL/L (ref 22–29)
EGFRCR SERPLBLD CKD-EPI 2021: 70 ML/MIN/1.73M2
GLUCOSE SERPL-MCNC: 88 MG/DL (ref 70–99)
HBA1C MFR BLD: 6.6 %
POTASSIUM SERPL-SCNC: 4.2 MMOL/L (ref 3.4–5.3)
SODIUM SERPL-SCNC: 135 MMOL/L (ref 135–145)

## 2024-05-09 PROCEDURE — 83036 HEMOGLOBIN GLYCOSYLATED A1C: CPT | Mod: ORL | Performed by: NURSE PRACTITIONER

## 2024-05-09 PROCEDURE — 80048 BASIC METABOLIC PNL TOTAL CA: CPT | Mod: ORL | Performed by: NURSE PRACTITIONER

## 2024-05-09 PROCEDURE — P9603 ONE-WAY ALLOW PRORATED MILES: HCPCS | Mod: ORL | Performed by: NURSE PRACTITIONER

## 2024-05-09 PROCEDURE — 36415 COLL VENOUS BLD VENIPUNCTURE: CPT | Mod: ORL | Performed by: NURSE PRACTITIONER

## 2024-05-10 VITALS
HEART RATE: 67 BPM | TEMPERATURE: 97 F | SYSTOLIC BLOOD PRESSURE: 146 MMHG | RESPIRATION RATE: 17 BRPM | DIASTOLIC BLOOD PRESSURE: 66 MMHG

## 2024-05-13 NOTE — PROGRESS NOTES
[facility] GERIATRICS    Chief Complaint   Patient presents with    RECHECK     HPI:  Rachel Weiss is a 74 year old  (1950), who is being seen today for an episodic care visit at: THE [facility] ([facility]) [[id number]].   She admitted to Memory Care at this facility 9/26/2023 for a higher level of care following hospitalization and tcu stay.   Medical history significant for vascular dementia, HFrEF, afib, CAD with STEMI and stent to LAD 11/2021, ischemic cardiomyopathy, PVD, lymphedema, diabetes type 2, CKD stage 3, HTN, asthma, rheumatoid arthritis, depression, anxiety.   She was hospitalized at [facility] 9/6-9/13/2023 with acute on chronic CHF and was found to be in afib with RVR. She was diuresed with lasix IV and discharged on torsemide (increased from 60 mg daily to 60 mg in am and 40 mg in pm). Empagliflozin was started. Digoxin dose was increased and metoprolol continued for afib. She was diuresed for 14 lbs with weight 181 lbs at discharge. Estimated dry weight ~182 lbs. She discharged to [facility] where she made fair progress in therapies. Torsemide was decreased to 40 mg bid and losartan decreased to 12.5 mg daily for hypotension and weight 177 lbs. She received cipro for UTI.   She was sent to the [facility] ED 11/28/2023 with acute onset of severe left hip pain. CT showed inflammation of the left iliopsoas muscle. She was unable to tolerate MRI. Ortho was concerned for occult fracture and plan was for MRI under general anesthesia, which was cancelled by Anesthesia due to potential risk. Pain spontaneously resolved and she was able to participate in therapies. Losartan was increased back to 25 mg daily. She returned to the facility 12/5/2023.   She was sent to the [facility] ED 1/30/2024 with an unresponsive episode. EMS reported seizure like activity lasting 10-15 mins. No seizure history. Video EEG was negative. CT head negative for acute pathology. ECHO: EF 25-30%. She was treated for possible  cellulitis of her LLE and UTI. UC 1/31/2024 with <10,000 mixed organisms. She tested positive for COVID-19 and was not treated. Losartan, torsemide and spironolactone were decreased. Jardiance discontinued due to UTI. Psychiatry consulted and ordered zyprexa prn delirium. She returned to the facility 2/6/2024.   She was sent to the [facility] ED 2/7/2024 with unresponsive episode and BP 96/50. She was alert and interactive in the ED. Labs and VS were within range and she returned to the facility.       Today's concern is:      Dementia with psychosis (H)  Paranoia (H)  Hallucinations  PVD (peripheral vascular disease) (H24)  Lymphedema  HFrEF (heart failure with reduced ejection fraction) (H)  Chronic atrial fibrillation (H)  Type 2 diabetes mellitus with stage 3a chronic kidney disease, without long-term current use of insulin (H)  Primary hypertension  Physical deconditioning  She is unable to provide history. Pleasant and talkative, though mostly nonsensical. Denies feeling ill. Denies pain. She is self propelling her wheelchair about the unit. Staff reports she has been more cooperative with cares with no behavior issues. Elyse is used for transfers and requires max assist of 1-2 with cares.   Spoke with her  Madhu Weiss, who feels her mood and paranoia are well managed. LE edema is much improved and she dislikes wearing compression wraps.       Allergies, and PMH/PSH reviewed in EPIC today    REVIEW OF SYSTEMS:  Unable to obtain due to dementia. Positives as noted under HPI.       Objective:   /80   Pulse 72   Temp 97.9  F (36.6  C)   Resp 20   Wt 67.1 kg (148 lb)   BMI 25.40 kg/m    GENERAL APPEARANCE:  Alert, in no distress  ENT:  [place], oropharynx clear  EYES:  conjunctiva and lids normal  RESP:  lungs clear to auscultation   CV:  irregular rate and rhythm, no murmur, peripheral edema trace both LE  ABDOMEN:  soft, non-tender, no distension  M/S:  wheelchair. DICKERSON with appropriate strength. No  joint inflammation   SKIN:  no visible rashes or open areas   PSYCH:  oriented to self, insight and judgement impaired, memory impaired , affect and mood normal    Recent labs in [facility] reviewed by me today.     ASSESSMENT / PLAN:  (F03.92) Dementia with psychosis (H)  (primary encounter diagnosis)  (F22) Paranoia (H)  (R44.3) Hallucinations  Comment: low functional status and declining verbal skills. Psychosis and paranoia much improved with low dose seroquel   She is visibly thinner and most recent weight is down 40 lbs, which may not be accurate. Weight loss is in part due to improved LE edema.   Plan: continue seroquel, sertraline. Memory Care staff assistance with cares, meals, activities, med admin.  DISCONTINUE MVI and folic acid to minimize meds.     (I73.9) PVD (peripheral vascular disease) (H24)  (I89.0) Lymphedema  Comment: LE edema much improved   Plan: discontinue compression wraps and monitor. Continue torsemide.     (I50.20) HFrEF (heart failure with reduced ejection fraction) (H)  Comment: weight loss as above   Plan: continue current dose of torsemide 40 mg daily and spironolactone 12.5 mg daily for now. Continue digoxin, metoprolol. Monitor symptoms and weight, adjust meds as indicated     (I48.20) Chronic atrial fibrillation (H)  Comment: rate controlled: 60-72  Plan: continue digoxin, metoprolol, apixaban     (E11.22,  N18.31) Type 2 diabetes mellitus with stage 3a chronic kidney disease, without long-term current use of insulin (H)  Comment: controlled with HgbA1c 6.6 on 5/9/2024  Trulicity dc'd due to hypoglycemia, Jardiance dc'd due to UTIs  Plan: continue metformin. No need for routine blood glucose monitoring, follow HgbA1c     (I10) Primary hypertension  Comment: controlled with BPs: 131/80, 125/71    Plan: continue torsemide, spironolactone, metoprolol     (R53.81) Physical deconditioning  Comment: functional status has significantly declined in recent months. No longer able to stand for  transfers and requires max assist with all cares. Now that her behaviors and mood are managed, she would benefit from working with therapies.   Plan: refer to [facility] for PHYSICAL THERAPY/OT for gait training, transfers, strengthening and ADL safety.    agrees with plan of care  Discussed with staff           Electronically signed by: SHALONDA Kincaid CNP

## 2024-05-14 ENCOUNTER — ASSISTED LIVING VISIT (OUTPATIENT)
Dept: GERIATRICS | Facility: CLINIC | Age: 74
End: 2024-05-14
Payer: COMMERCIAL

## 2024-05-14 VITALS
RESPIRATION RATE: 20 BRPM | BODY MASS INDEX: 25.4 KG/M2 | DIASTOLIC BLOOD PRESSURE: 80 MMHG | HEART RATE: 72 BPM | WEIGHT: 148 LBS | SYSTOLIC BLOOD PRESSURE: 131 MMHG | TEMPERATURE: 97.9 F

## 2024-05-14 DIAGNOSIS — I73.9 PVD (PERIPHERAL VASCULAR DISEASE) (H): ICD-10-CM

## 2024-05-14 DIAGNOSIS — F22 PARANOIA (H): ICD-10-CM

## 2024-05-14 DIAGNOSIS — I48.20 CHRONIC ATRIAL FIBRILLATION (H): ICD-10-CM

## 2024-05-14 DIAGNOSIS — R44.3 HALLUCINATIONS: ICD-10-CM

## 2024-05-14 DIAGNOSIS — F03.92 DEMENTIA WITH PSYCHOSIS (H): Primary | ICD-10-CM

## 2024-05-14 DIAGNOSIS — N18.31 TYPE 2 DIABETES MELLITUS WITH STAGE 3A CHRONIC KIDNEY DISEASE, WITHOUT LONG-TERM CURRENT USE OF INSULIN (H): ICD-10-CM

## 2024-05-14 DIAGNOSIS — R53.81 PHYSICAL DECONDITIONING: ICD-10-CM

## 2024-05-14 DIAGNOSIS — I50.20 HFREF (HEART FAILURE WITH REDUCED EJECTION FRACTION) (H): ICD-10-CM

## 2024-05-14 DIAGNOSIS — E11.22 TYPE 2 DIABETES MELLITUS WITH STAGE 3A CHRONIC KIDNEY DISEASE, WITHOUT LONG-TERM CURRENT USE OF INSULIN (H): ICD-10-CM

## 2024-05-14 DIAGNOSIS — I10 PRIMARY HYPERTENSION: ICD-10-CM

## 2024-05-14 DIAGNOSIS — I89.0 LYMPHEDEMA: ICD-10-CM

## 2024-05-14 PROCEDURE — 99349 HOME/RES VST EST MOD MDM 40: CPT | Performed by: NURSE PRACTITIONER

## 2024-05-14 NOTE — LETTER
5/14/2024        RE: Rachel Weiss  [facility]   [address]  [facility] [address]        [facility] GERIATRICS    Chief Complaint   Patient presents with     RECHECK     HPI:  Rachel Weiss is a 74 year old  (1950), who is being seen today for an episodic care visit at: THE [facility] ([facility]) [[id number]].   She admitted to Memory Care at this facility 9/26/2023 for a higher level of care following hospitalization and tcu stay.   Medical history significant for vascular dementia, HFrEF, afib, CAD with STEMI and stent to LAD 11/2021, ischemic cardiomyopathy, PVD, lymphedema, diabetes type 2, CKD stage 3, HTN, asthma, rheumatoid arthritis, depression, anxiety.   She was hospitalized at [facility] 9/6-9/13/2023 with acute on chronic CHF and was found to be in afib with RVR. She was diuresed with lasix IV and discharged on torsemide (increased from 60 mg daily to 60 mg in am and 40 mg in pm). Empagliflozin was started. Digoxin dose was increased and metoprolol continued for afib. She was diuresed for 14 lbs with weight 181 lbs at discharge. Estimated dry weight ~182 lbs. She discharged to [facility] TCU where she made fair progress in therapies. Torsemide was decreased to 40 mg bid and losartan decreased to 12.5 mg daily for hypotension and weight 177 lbs. She received cipro for UTI.   She was sent to the [facility] ED 11/28/2023 with acute onset of severe left hip pain. CT showed inflammation of the left iliopsoas muscle. She was unable to tolerate MRI. Ortho was concerned for occult fracture and plan was for MRI under general anesthesia, which was cancelled by Anesthesia due to potential risk. Pain spontaneously resolved and she was able to participate in therapies. Losartan was increased back to 25 mg daily. She returned to the facility 12/5/2023.   She was sent to the [facility] ED 1/30/2024 with an unresponsive episode. EMS reported seizure like activity lasting 10-15 mins. No  seizure history. Video EEG was negative. CT head negative for acute pathology. ECHO: EF 25-30%. She was treated for possible cellulitis of her LLE and UTI. UC 1/31/2024 with <10,000 mixed organisms. She tested positive for COVID-19 and was not treated. Losartan, torsemide and spironolactone were decreased. Jardiance discontinued due to UTI. Psychiatry consulted and ordered zyprexa prn delirium. She returned to the facility 2/6/2024.   She was sent to the [facility] ED 2/7/2024 with unresponsive episode and BP 96/50. She was alert and interactive in the ED. Labs and VS were within range and she returned to the facility.       Today's concern is:      Dementia with psychosis (H)  Paranoia (H)  Hallucinations  PVD (peripheral vascular disease) (H24)  Lymphedema  HFrEF (heart failure with reduced ejection fraction) (H)  Chronic atrial fibrillation (H)  Type 2 diabetes mellitus with stage 3a chronic kidney disease, without long-term current use of insulin (H)  Primary hypertension  Physical deconditioning  She is unable to provide history. Pleasant and talkative, though mostly nonsensical. Denies feeling ill. Denies pain. She is self propelling her wheelchair about the unit. Staff reports she has been more cooperative with cares with no behavior issues. Elyse is used for transfers and requires max assist of 1-2 with cares.   Spoke with her  Madhu Weiss, who feels her mood and paranoia are well managed. LE edema is much improved and she dislikes wearing compression wraps.       Allergies, and PMH/PSH reviewed in EPIC today    REVIEW OF SYSTEMS:  Unable to obtain due to dementia. Positives as noted under HPI.       Objective:   /80   Pulse 72   Temp 97.9  F (36.6  C)   Resp 20   Wt 67.1 kg (148 lb)   BMI 25.40 kg/m    GENERAL APPEARANCE:  Alert, in no distress  ENT:  [place], oropharynx clear  EYES:  conjunctiva and lids normal  RESP:  lungs clear to auscultation   CV:  irregular rate and rhythm, no murmur,  peripheral edema trace both LE  ABDOMEN:  soft, non-tender, no distension  M/S:  wheelchair. DICKERSON with appropriate strength. No joint inflammation   SKIN:  no visible rashes or open areas   PSYCH:  oriented to self, insight and judgement impaired, memory impaired , affect and mood normal    Recent labs in [facility] reviewed by me today.     ASSESSMENT / PLAN:  (F03.92) Dementia with psychosis (H)  (primary encounter diagnosis)  (F22) Paranoia (H)  (R44.3) Hallucinations  Comment: low functional status and declining verbal skills. Psychosis and paranoia much improved with low dose seroquel   She is visibly thinner and most recent weight is down 40 lbs, which may not be accurate. Weight loss is in part due to improved LE edema.   Plan: continue seroquel, sertraline. Memory Care staff assistance with cares, meals, activities, med admin.  DISCONTINUE MVI and folic acid to minimize meds.     (I73.9) PVD (peripheral vascular disease) (H24)  (I89.0) Lymphedema  Comment: LE edema much improved   Plan: discontinue compression wraps and monitor. Continue torsemide.     (I50.20) HFrEF (heart failure with reduced ejection fraction) (H)  Comment: weight loss as above   Plan: continue current dose of torsemide 40 mg daily and spironolactone 12.5 mg daily for now. Continue digoxin, metoprolol. Monitor symptoms and weight, adjust meds as indicated     (I48.20) Chronic atrial fibrillation (H)  Comment: rate controlled: 60-72  Plan: continue digoxin, metoprolol, apixaban     (E11.22,  N18.31) Type 2 diabetes mellitus with stage 3a chronic kidney disease, without long-term current use of insulin (H)  Comment: controlled with HgbA1c 6.6 on 5/9/2024  Trulicity dc'd due to hypoglycemia, Jardiance dc'd due to UTIs  Plan: continue metformin. No need for routine blood glucose monitoring, follow HgbA1c     (I10) Primary hypertension  Comment: controlled with BPs: 131/80, 125/71    Plan: continue torsemide, spironolactone, metoprolol     (R53.81)  Physical deconditioning  Comment: functional status has significantly declined in recent months. No longer able to stand for transfers and requires max assist with all cares. Now that her behaviors and mood are managed, she would benefit from working with therapies.   Plan: refer to [facility] for PHYSICAL THERAPY/OT for gait training, transfers, strengthening and ADL safety.    agrees with plan of care  Discussed with staff           Electronically signed by: SHALONDA Kincaid CNP             Documentation of Face-to-Face and Certification for Home Health Services     Patient: Rachel Weiss   YOB: 1950  MR Number: [id number]  Today's Date: 5/14/2024    I certify that patient: Rachel Weiss is under my care and that I, or a nurse practitioner or physician's assistant working with me, had a face-to-face encounter that meets the physician face-to-face encounter requirements with this patient on: 5/14/2024.    This encounter with the patient was in whole, or in part, for the following medical condition, which is the primary reason for home health care: dementia, declining functional status, physical deconditioning .    I certify that, based on my findings, the following services are medically necessary home health services: Occupational Therapy and Physical Therapy.    My clinical findings support the need for the above services because: Occupational Therapy Services are needed to assess and treat cognitive ability and address ADL safety due to impairment in cognition and functional status . and Physical Therapy Services are needed to assess and treat the following functional impairments: gait instability, transfers, ADL safety .    Further, I certify that my clinical findings support that this patient is homebound (i.e. absences from home require considerable and taxing effort and are for medical reasons or [employer] services or infrequently or of short duration when for other  reasons) because: Requires assistance of another person or specialized equipment to access medical services because patient: Is prone to wander/get lost without assistance., Is unable to exit home safely on own due to: dementia ., Is unable to operate assistive equipment on their own., Range of motion limitations prevents ability to exit home safely., and Requires supervision of another for safe transfer...    Based on the above findings. I certify that this patient is confined to the home and needs intermittent skilled nursing care, physical therapy and/or speech therapy.  The patient is under my care, and I have initiated the establishment of the plan of care.  This patient will be followed by a physician who will periodically review the plan of care.  Physician/Provider to provide follow up care: Jeffrey Thapa    Responsible Medicare certified [place] Physician: Dr.Sara Andressa MD, signing F2F and only signing for initial order. Please send all follow up questions and concerns or needed follow up signatures to the PCP, who [facility] has on file as:  Jeffrey Thapa.    Physician Signature: See electronic signature associated with these discharge orders.  Date: 5/14/2024        Sincerely,        SHALONDA Kincaid CNP

## 2024-05-14 NOTE — PROGRESS NOTES
Documentation of Face-to-Face and Certification for Home Health Services     Patient: Rachel Weiss   YOB: 1950  MR Number: [id number]  Today's Date: 5/14/2024    I certify that patient: Rachel Weiss is under my care and that I, or a nurse practitioner or physician's assistant working with me, had a face-to-face encounter that meets the physician face-to-face encounter requirements with this patient on: 5/14/2024.    This encounter with the patient was in whole, or in part, for the following medical condition, which is the primary reason for home health care: dementia, declining functional status, physical deconditioning .    I certify that, based on my findings, the following services are medically necessary home health services: Occupational Therapy and Physical Therapy.    My clinical findings support the need for the above services because: Occupational Therapy Services are needed to assess and treat cognitive ability and address ADL safety due to impairment in cognition and functional status . and Physical Therapy Services are needed to assess and treat the following functional impairments: gait instability, transfers, ADL safety .    Further, I certify that my clinical findings support that this patient is homebound (i.e. absences from home require considerable and taxing effort and are for medical reasons or [employer]ism services or infrequently or of short duration when for other reasons) because: Requires assistance of another person or specialized equipment to access medical services because patient: Is prone to wander/get lost without assistance., Is unable to exit home safely on own due to: dementia ., Is unable to operate assistive equipment on their own., Range of motion limitations prevents ability to exit home safely., and Requires supervision of another for safe transfer...    Based on the above findings. I certify that this patient is confined to the home and needs  intermittent skilled nursing care, physical therapy and/or speech therapy.  The patient is under my care, and I have initiated the establishment of the plan of care.  This patient will be followed by a physician who will periodically review the plan of care.  Physician/Provider to provide follow up care: Jeffrey Thapa    Responsible Medicare certified [place] Physician: Dr.Sara Andressa MD, signing F2F and only signing for initial order. Please send all follow up questions and concerns or needed follow up signatures to the PCP, who [facility] has on file as:  Jeffrey Thapa.    Physician Signature: See electronic signature associated with these discharge orders.  Date: 5/14/2024

## 2024-06-13 ENCOUNTER — TELEPHONE (OUTPATIENT)
Dept: GERIATRICS | Facility: CLINIC | Age: 74
End: 2024-06-13
Payer: COMMERCIAL

## 2024-06-13 RX ORDER — NYSTATIN 100000 [USP'U]/G
POWDER TOPICAL 2 TIMES DAILY
COMMUNITY
End: 2024-06-25

## 2024-06-13 NOTE — TELEPHONE ENCOUNTER
[facility] NON-EMERGENT ENCOUNTER    Rachel Weiss is a 74 year old  (1950)    Disposition  Pt has order for Nystatin cream but it has not been effective. Rash to qiana-area is spreading. Area is very moist.     YAIR provided to Katelyn NELSON/correction:   Nystatin Powder [id number] units BID  Discontinue Nystatin Cream      Electronically signed by:   Gayatri Dawn RN

## 2024-06-25 ENCOUNTER — ASSISTED LIVING VISIT (OUTPATIENT)
Dept: GERIATRICS | Facility: CLINIC | Age: 74
End: 2024-06-25
Payer: COMMERCIAL

## 2024-06-25 ENCOUNTER — LAB REQUISITION (OUTPATIENT)
Dept: LAB | Facility: CLINIC | Age: 74
End: 2024-06-25
Payer: COMMERCIAL

## 2024-06-25 VITALS
SYSTOLIC BLOOD PRESSURE: 152 MMHG | BODY MASS INDEX: 24.29 KG/M2 | DIASTOLIC BLOOD PRESSURE: 93 MMHG | WEIGHT: 141.5 LBS | RESPIRATION RATE: 17 BRPM | HEART RATE: 89 BPM | TEMPERATURE: 97.5 F

## 2024-06-25 DIAGNOSIS — E11.22 TYPE 2 DIABETES MELLITUS WITH STAGE 3A CHRONIC KIDNEY DISEASE, WITHOUT LONG-TERM CURRENT USE OF INSULIN (H): ICD-10-CM

## 2024-06-25 DIAGNOSIS — L25.8 DERMATITIS ASSOCIATED WITH INCONTINENCE: ICD-10-CM

## 2024-06-25 DIAGNOSIS — I89.0 LYMPHEDEMA: ICD-10-CM

## 2024-06-25 DIAGNOSIS — F03.92 DEMENTIA WITH PSYCHOSIS (H): ICD-10-CM

## 2024-06-25 DIAGNOSIS — I50.20 HFREF (HEART FAILURE WITH REDUCED EJECTION FRACTION) (H): ICD-10-CM

## 2024-06-25 DIAGNOSIS — I10 PRIMARY HYPERTENSION: ICD-10-CM

## 2024-06-25 DIAGNOSIS — I48.20 CHRONIC ATRIAL FIBRILLATION (H): ICD-10-CM

## 2024-06-25 DIAGNOSIS — R44.3 HALLUCINATIONS: ICD-10-CM

## 2024-06-25 DIAGNOSIS — L03.90 CELLULITIS, UNSPECIFIED: ICD-10-CM

## 2024-06-25 DIAGNOSIS — F22 PARANOIA (H): ICD-10-CM

## 2024-06-25 DIAGNOSIS — N18.31 TYPE 2 DIABETES MELLITUS WITH STAGE 3A CHRONIC KIDNEY DISEASE, WITHOUT LONG-TERM CURRENT USE OF INSULIN (H): ICD-10-CM

## 2024-06-25 DIAGNOSIS — L03.317 CELLULITIS OF BUTTOCK: Primary | ICD-10-CM

## 2024-06-25 DIAGNOSIS — R32 DERMATITIS ASSOCIATED WITH INCONTINENCE: ICD-10-CM

## 2024-06-25 PROCEDURE — 99349 HOME/RES VST EST MOD MDM 40: CPT | Performed by: NURSE PRACTITIONER

## 2024-06-25 RX ORDER — BENZOCAINE/MENTHOL 6 MG-10 MG
LOZENGE MUCOUS MEMBRANE 2 TIMES DAILY
Qty: 453 G | Refills: 11 | Status: SHIPPED | OUTPATIENT
Start: 2024-06-25 | End: 2024-07-31

## 2024-06-25 RX ORDER — CIPROFLOXACIN 500 MG/1
500 TABLET, FILM COATED ORAL 2 TIMES DAILY
Qty: 20 TABLET | Refills: 0 | Status: SHIPPED | OUTPATIENT
Start: 2024-06-25 | End: 2024-07-05

## 2024-06-25 NOTE — LETTER
6/25/2024      [facility]achel [facility]heed [address] [facility] [address]        No notes on file      Sincerely,        SHALONDA Kincaid CNP

## 2024-06-25 NOTE — PROGRESS NOTES
[facility] GERIATRICS    Chief Complaint   Patient presents with    RECHECK     HPI:  Rachel Weiss is a 74 year old  (1950), who is being seen today for an episodic care visit at: THE [facility] ([facility]) [[id number]].   She admitted to Memory Care at this facility 9/26/2023 for a higher level of care following hospitalization and tcu stay.   Medical history significant for vascular dementia, HFrEF, afib, CAD with STEMI and stent to LAD 11/2021, ischemic cardiomyopathy, PVD, lymphedema, diabetes type 2, CKD stage 3, HTN, asthma, rheumatoid arthritis, depression, anxiety.   She was hospitalized at [facility] 9/6-9/13/2023 with acute on chronic CHF and was found to be in afib with RVR. She was diuresed with lasix IV and discharged on torsemide (increased from 60 mg daily to 60 mg in am and 40 mg in pm). Empagliflozin was started. Digoxin dose was increased and metoprolol continued for afib. She was diuresed for 14 lbs with weight 181 lbs at discharge. Estimated dry weight ~182 lbs. She discharged to [facility] TCU where she made fair progress in therapies. Torsemide was decreased to 40 mg bid and losartan decreased to 12.5 mg daily for hypotension and weight 177 lbs. She received cipro for UTI.   She was sent to the [facility] ED 11/28/2023 with acute onset of severe left hip pain. CT showed inflammation of the left iliopsoas muscle. She was unable to tolerate MRI. Ortho was concerned for occult fracture and plan was for MRI under general anesthesia, which was cancelled by Anesthesia due to potential risk. Pain spontaneously resolved and she was able to participate in therapies. Losartan was increased back to 25 mg daily. She returned to the facility 12/5/2023.   She was sent to the [facility] ED 1/30/2024 with an unresponsive episode. EMS reported seizure like activity lasting 10-15 mins. No seizure history. Video EEG was negative. CT head negative for acute pathology. ECHO: EF 25-30%. She was treated for possible  cellulitis of her LLE and UTI. UC 1/31/2024 with <10,000 mixed organisms. She tested positive for COVID-19 and was not treated. Losartan, torsemide and spironolactone were decreased. Jardiance discontinued due to UTI. Psychiatry consulted and ordered zyprexa prn delirium. She returned to the facility 2/6/2024.   She was sent to the [facility] ED 2/7/2024 with unresponsive episode and BP 96/50. She was alert and interactive in the ED. Labs and VS were within range and she returned to the facility.     Today's concern is:      Cellulitis of buttock  Dermatitis associated with incontinence  Dementia with psychosis (H)  Paranoia (H)  Hallucinations  Chronic atrial fibrillation (H)  HFrEF (heart failure with reduced ejection fraction) (H)  Lymphedema  Type 2 diabetes mellitus with stage 3a chronic kidney disease, without long-term current use of insulin (H)  Primary hypertension  She is seen today after staff reported worsening rash of groin and buttocks. She is a poor historian, but reports pain of the area. Wheelchair bound. Can be resistive with cares, including changing her incontinent brief. Staff reports agitation and hallucinations have improved.  Elyse is used for transfers, requires max assist of 1-2 with cares.   Spoke with her  Madhu Weiss, who feels that her mood is good and paranoia much improved.       Allergies, and PMH/PSH reviewed in EPIC today    REVIEW OF SYSTEMS:  Unable to obtain due to dementia. Positives as noted under HPI.       Objective:   BP (!) 152/93   Pulse 89   Temp 97.5  F (36.4  C)   Resp 17   Wt 64.2 kg (141 lb 8 oz)   BMI 24.29 kg/m    GENERAL APPEARANCE:  Alert, in no distress  ENT:  [place]   EYES:  conjunctiva and lids normal  RESP:  lungs clear to auscultation   CV:  irregular rate and rhythm, no murmur, peripheral edema trace both LE  ABDOMEN:  soft, non-tender, no distension  M/S:  in bed. DICKERSON with appropriate strength. No joint inflammation   SKIN:  bright erythema with areas  of excoriation of buttocks and qiana area, worse in thigh skin folds, few spotty areas of erythema of hips, low back and low abdomen.   PSYCH:  oriented to self, insight and judgement impaired, memory impaired , affect and mood normal    Recent labs in [facility] reviewed by me today.       ASSESSMENT / PLAN:  (L03.317) Cellulitis of buttock  (primary encounter diagnosis)  (L25.8,  R32) Dermatitis associated with incontinence  Comment: area very inflamed and excoriated. Possible reaction to incontinent product? Or nystatin? She was examined with staff assistance   Discussed with  and he will supply a different brand of incontinent brief   Plan: cipro X 10 days for cellulitis (allergic to most antibiotics). CBC, BMP. HC cream bid, cover with barrier cream. DISCONTINUE nystatin. Frequent changing and repositioning.     (F03.92) Dementia with psychosis (H)  (F22) Paranoia (H)  (R44.3) Hallucinations  Comment: severe deficits. Hallucinations and paranoia improved with seroquel  Plan: continue seroquel, sertraline. Memory Care staff assistance with cares, meals, activities, med admin    (I48.20) Chronic atrial fibrillation (H)  Comment: rate controlled: 65-80  Plan: continue metoprolol, digoxin, apixaban     (I50.20) HFrEF (heart failure with reduced ejection fraction) (H)  (I89.0) Lymphedema  Comment: euvolemic. Weight is down another 7 lbs-oral intake varies   Plan: continue torsemide, spironolactone. Follow weight, symptoms. BMP     (E11.22,  N18.31) Type 2 diabetes mellitus with stage 3a chronic kidney disease, without long-term current use of insulin (H)  Comment: HgbA1c 6.6 on 5/9/2024  Trulicity dc'd due to hypoglycemia, Jardiance dc'd due to UTI   Plan: continue metformin. Follow HgbA1c     (I10) Primary hypertension  Comment: controlled with BPs: 145/92, 122/76, 117/72, 131/80  Plan: continue metoprolol, torsemide, spironolactone        Electronically signed by: SHALONDA Kincaid CNP

## 2024-06-28 LAB
ANION GAP SERPL CALCULATED.3IONS-SCNC: 15 MMOL/L (ref 7–15)
BUN SERPL-MCNC: 29.5 MG/DL (ref 8–23)
CALCIUM SERPL-MCNC: 9.8 MG/DL (ref 8.8–10.2)
CHLORIDE SERPL-SCNC: 95 MMOL/L (ref 98–107)
CREAT SERPL-MCNC: 1.07 MG/DL (ref 0.51–0.95)
DEPRECATED HCO3 PLAS-SCNC: 27 MMOL/L (ref 22–29)
EGFRCR SERPLBLD CKD-EPI 2021: 54 ML/MIN/1.73M2
ERYTHROCYTE [DISTWIDTH] IN BLOOD BY AUTOMATED COUNT: 21.7 % (ref 10–15)
GLUCOSE SERPL-MCNC: 100 MG/DL (ref 70–99)
HCT VFR BLD AUTO: 42.3 % (ref 35–47)
HGB BLD-MCNC: 13.1 G/DL (ref 11.7–15.7)
MCH RBC QN AUTO: 28.5 PG (ref 26.5–33)
MCHC RBC AUTO-ENTMCNC: 31 G/DL (ref 31.5–36.5)
MCV RBC AUTO: 92 FL (ref 78–100)
PLATELET # BLD AUTO: 274 10E3/UL (ref 150–450)
POTASSIUM SERPL-SCNC: 4.4 MMOL/L (ref 3.4–5.3)
RBC # BLD AUTO: 4.59 10E6/UL (ref 3.8–5.2)
SODIUM SERPL-SCNC: 137 MMOL/L (ref 135–145)
WBC # BLD AUTO: 7.5 10E3/UL (ref 4–11)

## 2024-06-28 PROCEDURE — 36415 COLL VENOUS BLD VENIPUNCTURE: CPT | Mod: ORL | Performed by: NURSE PRACTITIONER

## 2024-06-28 PROCEDURE — 85027 COMPLETE CBC AUTOMATED: CPT | Mod: ORL | Performed by: NURSE PRACTITIONER

## 2024-06-28 PROCEDURE — 80048 BASIC METABOLIC PNL TOTAL CA: CPT | Mod: ORL | Performed by: NURSE PRACTITIONER

## 2024-06-28 PROCEDURE — P9604 ONE-WAY ALLOW PRORATED TRIP: HCPCS | Mod: ORL | Performed by: NURSE PRACTITIONER

## 2024-07-02 ENCOUNTER — ASSISTED LIVING VISIT (OUTPATIENT)
Dept: GERIATRICS | Facility: CLINIC | Age: 74
End: 2024-07-02
Payer: COMMERCIAL

## 2024-07-02 VITALS
SYSTOLIC BLOOD PRESSURE: 96 MMHG | RESPIRATION RATE: 20 BRPM | BODY MASS INDEX: 23.35 KG/M2 | HEART RATE: 79 BPM | OXYGEN SATURATION: 97 % | WEIGHT: 136.8 LBS | HEIGHT: 64 IN | DIASTOLIC BLOOD PRESSURE: 59 MMHG | TEMPERATURE: 97.4 F

## 2024-07-02 DIAGNOSIS — F03.92 DEMENTIA WITH PSYCHOSIS (H): ICD-10-CM

## 2024-07-02 DIAGNOSIS — L25.8 DERMATITIS ASSOCIATED WITH INCONTINENCE: Primary | ICD-10-CM

## 2024-07-02 DIAGNOSIS — L03.317 CELLULITIS OF BUTTOCK: ICD-10-CM

## 2024-07-02 DIAGNOSIS — R32 DERMATITIS ASSOCIATED WITH INCONTINENCE: Primary | ICD-10-CM

## 2024-07-02 PROCEDURE — 99348 HOME/RES VST EST LOW MDM 30: CPT | Performed by: NURSE PRACTITIONER

## 2024-07-02 RX ORDER — ZINC OXIDE 16 %
OINTMENT (GRAM) TOPICAL
Qty: 454 G | Refills: 11 | Status: SHIPPED | OUTPATIENT
Start: 2024-07-02 | End: 2024-07-31

## 2024-07-02 NOTE — PROGRESS NOTES
[facility] GERIATRICS    Chief Complaint   Patient presents with    RECHECK     HPI:  Rachel Weiss is a 74 year old  (1950), who is being seen today for an episodic care visit at: THE [facility] ([facility]) [[id number]].   She admitted to Memory Care at this facility 9/26/2023 for a higher level of care following hospitalization and tcu stay.   Medical history significant for vascular dementia, HFrEF, afib, CAD with STEMI and stent to LAD 11/2021, ischemic cardiomyopathy, PVD, lymphedema, diabetes type 2, CKD stage 3, HTN, asthma, rheumatoid arthritis, depression, anxiety.   She was hospitalized at [facility] 9/6-9/13/2023 with acute on chronic CHF and was found to be in afib with RVR. She was diuresed with lasix IV and discharged on torsemide (increased from 60 mg daily to 60 mg in am and 40 mg in pm). Empagliflozin was started. Digoxin dose was increased and metoprolol continued for afib. She was diuresed for 14 lbs with weight 181 lbs at discharge. Estimated dry weight ~182 lbs. She discharged to [facility] TCU where she made fair progress in therapies. Torsemide was decreased to 40 mg bid and losartan decreased to 12.5 mg daily for hypotension and weight 177 lbs. She received cipro for UTI.   She was sent to the [facility] ED 11/28/2023 with acute onset of severe left hip pain. CT showed inflammation of the left iliopsoas muscle. She was unable to tolerate MRI. Ortho was concerned for occult fracture and plan was for MRI under general anesthesia, which was cancelled by Anesthesia due to potential risk. Pain spontaneously resolved and she was able to participate in therapies. Losartan was increased back to 25 mg daily. She returned to the facility 12/5/2023.   She was sent to the [facility] ED 1/30/2024 with an unresponsive episode. EMS reported seizure like activity lasting 10-15 mins. No seizure history. Video EEG was negative. CT head negative for acute pathology. ECHO: EF 25-30%. She was treated for possible  "cellulitis of her LLE and UTI. UC 1/31/2024 with <10,000 mixed organisms. She tested positive for COVID-19 and was not treated. Losartan, torsemide and spironolactone were decreased. Jardiance discontinued due to UTI. Psychiatry consulted and ordered zyprexa prn delirium. She returned to the facility 2/6/2024.   She was sent to the [facility] ED 2/7/2024 with unresponsive episode and BP 96/50. She was alert and interactive in the ED. Labs and VS were within range and she returned to the facility.     Today's concern is:      Dermatitis associated with incontinence  Cellulitis of buttock  Dementia with psychosis (H)  She is seen today to follow up on cellulitis and excoriation of buttocks and qiana area. She is unable to provide history. Pleasant and cooperative, appears comfortable while staff transferred her to bed and changed her incontinent brief. Staff reports she remains resistive with cares at times. Wheelchair bound, sujit is used for transfers. Requires max assist with cares.       Allergies, and PMH/PSH reviewed in EPIC today    REVIEW OF SYSTEMS:  Unable to obtain due to dementia. Positives as noted under HPI.       Objective:   BP 96/59   Pulse 79   Temp 97.4  F (36.3  C)   Resp 20   Ht 1.626 m (5' 4\")   Wt 62.1 kg (136 lb 12.8 oz)   SpO2 97%   BMI 23.48 kg/m    GENERAL APPEARANCE:  Alert, in no distress  ENT:  [place]   EYES:  conjunctiva and lids normal  RESP:  no respiratory distress   CV:  peripheral edema trace both   M/S:  in bed. DICKERSON with appropriate strength. No joint inflammation   SKIN:  mild erythema and excoriation of buttocks, groin, qiana area, patchy dry areas of erythema along lower abdomen and back.   PSYCH:  oriented to self, insight and judgement impaired, memory impaired , affect and mood normal      ASSESSMENT / PLAN:  (L25.8,  R32) Dermatitis associated with incontinence  (primary encounter diagnosis)  (L03.317) Cellulitis of buttock  Comment: completed a course of Cipro. Skin appears " improved, though still excoriated   Plan: continue HC cream to inflamed areas that appear to be a reaction to incontinent product. Zinc oxide to buttocks, qiana area with incontinent episodes. Keep skin clean and dry, frequent repositioning.  Message left updating her .       (F03.92) Dementia with psychosis (H)  Comment: severe deficits, appears at baseline   Plan: continue seroquel, sertraline. Memory Care staff assistance with cares, meals, activities, med admin          Electronically signed by: SHALONDA Kincaid CNP

## 2024-07-02 NOTE — LETTER
7/2/2024      [facility]achel [facility]heed [address] [facility] [address]        No notes on file      Sincerely,        SHALONDA Kincaid CNP

## 2024-07-29 PROBLEM — R40.20 LOSS OF CONSCIOUSNESS (H): Status: ACTIVE | Noted: 2024-01-30

## 2024-07-29 PROBLEM — N10 ACUTE PYELONEPHRITIS: Status: ACTIVE | Noted: 2024-02-05

## 2024-07-29 PROBLEM — I48.91 ATRIAL FIBRILLATION (H): Status: ACTIVE | Noted: 2023-11-01

## 2024-07-29 PROBLEM — M25.552 LEFT HIP PAIN: Status: ACTIVE | Noted: 2023-11-28

## 2024-07-29 NOTE — PROGRESS NOTES
[facility] GERIATRICS    Chief Complaint   Patient presents with    RECHECK     HPI:  Rachel Weiss is a 74 year old  (1950), who is being seen today for an episodic care visit at: THE [facility] ([facility]) [[id number]].   She admitted to Memory Care at this facility 9/26/2023 for a higher level of care following hospitalization and tcu stay.   Medical history significant for vascular dementia, HFrEF, afib, CAD with STEMI and stent to LAD 11/2021, ischemic cardiomyopathy, PVD, lymphedema, diabetes type 2, CKD stage 3, HTN, asthma, rheumatoid arthritis, depression, anxiety.   She was hospitalized at [facility] 9/6-9/13/2023 with acute on chronic CHF and was found to be in afib with RVR. She was diuresed with lasix IV and discharged on torsemide (increased from 60 mg daily to 60 mg in am and 40 mg in pm). Empagliflozin was started. Digoxin dose was increased and metoprolol continued for afib. She was diuresed for 14 lbs with weight 181 lbs at discharge. Estimated dry weight ~182 lbs. She discharged to [facility] TCU where she made fair progress in therapies. Torsemide was decreased to 40 mg bid and losartan decreased to 12.5 mg daily for hypotension and weight 177 lbs. She received cipro for UTI.   She was sent to the [facility] ED 11/28/2023 with acute onset of severe left hip pain. CT showed inflammation of the left iliopsoas muscle. She was unable to tolerate MRI. Ortho was concerned for occult fracture and plan was for MRI under general anesthesia, which was cancelled by Anesthesia due to potential risk. Pain spontaneously resolved and she was able to participate in therapies. Losartan was increased back to 25 mg daily. She returned to the facility 12/5/2023.   She was sent to the [facility] ED 1/30/2024 with an unresponsive episode. EMS reported seizure like activity lasting 10-15 mins. No seizure history. Video EEG was negative. CT head negative for acute pathology. ECHO: EF 25-30%. She was treated for possible  "cellulitis of her LLE and UTI. UC 1/31/2024 with <10,000 mixed organisms. She tested positive for COVID-19 and was not treated. Losartan, torsemide and spironolactone were decreased. Jardiance discontinued due to UTI. Psychiatry consulted and ordered zyprexa prn delirium. She returned to the facility 2/6/2024.   She was sent to the [facility] ED 2/7/2024 with unresponsive episode and BP 96/50. She was alert and interactive in the ED. Labs and VS were within range and she returned to the facility.     Today's concern is:      Dermatitis associated with incontinence  Recurrent UTI  Dementia with psychosis (H)  Paranoia (H)  Hallucinations  Chronic atrial fibrillation (H)  HFrEF (heart failure with reduced ejection fraction) (H)  Lymphedema  Type 2 diabetes mellitus with stage 3a chronic kidney disease, without long-term current use of insulin (H)  She is seen today to follow up on multiple chronic issues, as well as recent cellulitis and skin breakdown of her buttocks.   She is unable to provide history. Alert and in bed, somewhat anxious. Wheelchair bound and sujit is used for transfers. Requires max assist of 1-2 with cares. Staff reports she's been more agitated and teary recently.   Spoke with her  Madhu Weiss, who reports she has been more emotional and confused, which is typical when she has a UTI. He requests starting an antibiotic. He has supplied a different incontinent brief, as we thought the previous brief may have been contributing to skin irritation.     Allergies, and PMH/PSH reviewed in EPIC today    REVIEW OF SYSTEMS:  Unable to obtain due to dementia. Positives as noted under HPI.       Objective:   BP 96/59   Pulse 79   Temp 97.4  F (36.3  C)   Resp 20   Ht 1.626 m (5' 4\")   Wt 63.9 kg (140 lb 12.8 oz)   BMI 24.17 kg/m    GENERAL APPEARANCE:  Alert, in no distress  ENT:  [place]   EYES:  conjunctiva and lids normal  RESP:  lungs clear to auscultation   CV:  irregular rate and rhythm, no murmur, " peripheral edema trace both LE  ABDOMEN:  soft, non-tender, no distension  M/S:  in bed. DICKERSON with appropriate strength. No joint inflammation   SKIN:  erythema and mild excoriation of buttocks and qiana area with patchy areas of erythema of thighs and lower abdomen   PSYCH:  oriented to self, insight and judgement impaired, memory impaired, anxious     ASSESSMENT / PLAN:  (L25.8,  R32) Dermatitis associated with incontinence  (primary encounter diagnosis)  Comment: infection appears resolved, but not much improvement in excoriation   Plan: discontinue all current topicals and start Calmoseptine with incontinent episodes. Keep skin clean and dry, encourage resting in bed during the day to reduce pressure   agrees with plan of care  Discussed with staff     (N39.0) Recurrent UTI  Comment: increased behavior issues and crying, typical symptoms when she has a UTI. She is incontinent and unable to provide a specimen.   Plan: cipro 500 mg bid X 5 days. Monitor symptoms     (F03.92) Dementia with psychosis (H)  (F22) Paranoia (H)  (R44.3) Hallucinations  Comment: severe deficits. Hallucinations and paranoia have improved with seroquel. Possible UTI as above   Plan: continue seroquel, sertraline. Memory Care staff assistance with cares, meals, activities, med admin    (I48.20) Chronic atrial fibrillation (H)  Comment: rate controlled   Plan: continue metoprolol, digoxin, apixaban     (I50.20) HFrEF (heart failure with reduced ejection fraction) (H)  (I89.0) Lymphedema  Comment: appears euvolemic. LE edema much improved.  Plan: continue torsemide, spironolactone. Follow symptoms, weight    (E11.22,  N18.31) Type 2 diabetes mellitus with stage 3a chronic kidney disease, without long-term current use of insulin (H)  Comment: controlled with HgbA1c 6.6 on 5/9/2024  Trulicity dc'd due to hypoglycemia. Jardiance dc'd due to UTI  Plan: continue metformin       Electronically signed by: SHALONDA Kincaid CNP        Yes

## 2024-07-30 ENCOUNTER — ASSISTED LIVING VISIT (OUTPATIENT)
Dept: GERIATRICS | Facility: CLINIC | Age: 74
End: 2024-07-30
Payer: COMMERCIAL

## 2024-07-30 VITALS
TEMPERATURE: 97.4 F | RESPIRATION RATE: 20 BRPM | HEIGHT: 64 IN | DIASTOLIC BLOOD PRESSURE: 59 MMHG | WEIGHT: 140.8 LBS | HEART RATE: 79 BPM | SYSTOLIC BLOOD PRESSURE: 96 MMHG | BODY MASS INDEX: 24.04 KG/M2

## 2024-07-30 DIAGNOSIS — L25.8 DERMATITIS ASSOCIATED WITH INCONTINENCE: Primary | ICD-10-CM

## 2024-07-30 DIAGNOSIS — I50.20 HFREF (HEART FAILURE WITH REDUCED EJECTION FRACTION) (H): ICD-10-CM

## 2024-07-30 DIAGNOSIS — N39.0 RECURRENT UTI: ICD-10-CM

## 2024-07-30 DIAGNOSIS — R44.3 HALLUCINATIONS: ICD-10-CM

## 2024-07-30 DIAGNOSIS — F03.92 DEMENTIA WITH PSYCHOSIS (H): ICD-10-CM

## 2024-07-30 DIAGNOSIS — F22 PARANOIA (H): ICD-10-CM

## 2024-07-30 DIAGNOSIS — R32 DERMATITIS ASSOCIATED WITH INCONTINENCE: Primary | ICD-10-CM

## 2024-07-30 DIAGNOSIS — N18.31 TYPE 2 DIABETES MELLITUS WITH STAGE 3A CHRONIC KIDNEY DISEASE, WITHOUT LONG-TERM CURRENT USE OF INSULIN (H): ICD-10-CM

## 2024-07-30 DIAGNOSIS — I89.0 LYMPHEDEMA: ICD-10-CM

## 2024-07-30 DIAGNOSIS — I48.20 CHRONIC ATRIAL FIBRILLATION (H): ICD-10-CM

## 2024-07-30 DIAGNOSIS — E11.22 TYPE 2 DIABETES MELLITUS WITH STAGE 3A CHRONIC KIDNEY DISEASE, WITHOUT LONG-TERM CURRENT USE OF INSULIN (H): ICD-10-CM

## 2024-07-30 PROCEDURE — 99349 HOME/RES VST EST MOD MDM 40: CPT | Performed by: NURSE PRACTITIONER

## 2024-07-30 RX ORDER — ANORECTAL OINTMENT 15.7; .44; 24; 20.6 G/100G; G/100G; G/100G; G/100G
OINTMENT TOPICAL
Qty: 113 G | Refills: 11 | Status: SHIPPED | OUTPATIENT
Start: 2024-07-30

## 2024-07-30 RX ORDER — CIPROFLOXACIN 500 MG/1
500 TABLET, FILM COATED ORAL 2 TIMES DAILY
Qty: 10 TABLET | Refills: 0 | Status: SHIPPED | OUTPATIENT
Start: 2024-07-30 | End: 2024-08-04

## 2024-07-30 NOTE — LETTER
7/30/2024      Rachel Weiss  [facility]   [address] [facility] [address]        [place] [facility]  [place] Medical Record Number: [id number]  [facility] [[id number][facility]  Chief Complaint   Patient presents with    RECHECK       HPI:    Rachel Weiss is a 74 year old (1950), who is being seen today for an episodic care visit. HPI information obtained from: {FGS HPI:166017}. Today's concern is:  {FGS DX:868071}    Past Medical and Surgical History reviewed in Epic today.    MEDICATIONS:  Current Outpatient Medications   Medication Sig Dispense Refill    acetaminophen (TYLENOL) 325 MG tablet Take 2 tablets (650 mg) by mouth every 4 hours as needed for mild pain 30 tablet 11    digoxin (LANOXIN) 125 MCG tablet Take 1 tablet (125 mcg) by mouth daily 90 tablet 3    dimethicone-zinc oxide (ANSLEY PROTECT) external cream Apply topically daily      ELIQUIS ANTICOAGULANT 5 MG tablet TAKE 1 TABLET BY MOUTH TWICE DAILY FOR  tablet 97    hydrocortisone (CORTAID) 1 % external cream Apply topically 2 times daily Apply to affected areas bid 453 g 11    metFORMIN (GLUCOPHAGE) 500 MG tablet Take 2 tablets (1,000 mg) by mouth 2 times daily (with meals) 360 tablet 3    metoprolol succinate ER (TOPROL XL) 25 MG 24 hr tablet Take 1 tablet (25 mg) by mouth daily 90 tablet 3    miconazole with skin protectant (ANSLEY ANTIFUNGAL) 2 % CREA cream Apply topically 2 times daily 198 g 11    nitroGLYcerin (NITROSTAT) 0.4 MG sublingual tablet Place 0.4 mg under the tongue every 5 minutes as needed for chest pain For chest pain place 1 tablet under the tongue every 5 minutes for 3 doses. If symptoms persist 5 minutes after 1st dose call 911.      potassium chloride ER (K-TAB) 20 MEQ CR tablet Take 1 tablet (20 mEq) by mouth daily 30 tablet 11    QUEtiapine (SEROQUEL) 25 MG tablet 12.5 mg po at 5 pm plus 12.5 mg po daily prn  hallucinations/delusions 30 tablet 11    rosuvastatin (CRESTOR) 10 MG tablet Take 1 tablet (10 mg) by mouth daily 90 tablet 3    sertraline (ZOLOFT) 50 MG tablet TAKE 1 TABLET BY MOUTH ONCE DAILY 90 tablet 97    spironolactone (ALDACTONE) 25 MG tablet Take 0.5 tablets (12.5 mg) by mouth daily 45 tablet 3    torsemide (DEMADEX) 20 MG tablet Take 2 tablets (40 mg) by mouth daily 60 tablet 11    vitamin D3 (CHOLECALCIFEROL) 50 mcg (2000 units) tablet Take 1 tablet (50 mcg) by mouth daily 90 tablet 3    zinc oxide (ZINC OXIDE 16 %) 16 % Apply thin layer to buttocks, groin, inner thighs with each incontinent product change 454 g 11     ***    REVIEW OF SYSTEMS:  {ROS FGS:072509}    Objective:  There were no vitals taken for this visit.  Exam:  {[facility] physical exam :280438}    Labs:   {fgslab:732481}    ASSESSMENT/PLAN:  {FGS DX:276971}    {fgsorders:228689}  ***    {fgstime1:939284}  Electronically signed by:  Monae Delgadillo ***  {Providers Please double check the med list (in the plan section >> meds & orders tab) and Discontinue any of the meds flagged by the TC to be discontinued}      Sincerely,        SHALONDA Kincaid CNP

## 2024-08-07 NOTE — PROGRESS NOTES
[facility] GERIATRICS    Chief Complaint   Patient presents with    RECHECK     HPI:  Rachel Weiss is a 74 year old  (1950), who is being seen today for an episodic care visit at: THE [facility] ([facility]) [[id number]].   She admitted to Memory Care at this facility 9/26/2023 for a higher level of care following hospitalization and tcu stay.   Medical history significant for vascular dementia, HFrEF, afib, CAD with STEMI and stent to LAD 11/2021, ischemic cardiomyopathy, PVD, lymphedema, diabetes type 2, CKD stage 3, HTN, asthma, rheumatoid arthritis, depression, anxiety.   She was hospitalized at [facility] 9/6-9/13/2023 with acute on chronic CHF and was found to be in afib with RVR. She was diuresed with lasix IV and discharged on torsemide (increased from 60 mg daily to 60 mg in am and 40 mg in pm). Empagliflozin was started. Digoxin dose was increased and metoprolol continued for afib. She was diuresed for 14 lbs with weight 181 lbs at discharge. Estimated dry weight ~182 lbs. She discharged to [facility] TCU where she made fair progress in therapies. Torsemide was decreased to 40 mg bid and losartan decreased to 12.5 mg daily for hypotension and weight 177 lbs. She received cipro for UTI.   She was sent to the [facility] ED 11/28/2023 with acute onset of severe left hip pain. CT showed inflammation of the left iliopsoas muscle. She was unable to tolerate MRI. Ortho was concerned for occult fracture and plan was for MRI under general anesthesia, which was cancelled by Anesthesia due to potential risk. Pain spontaneously resolved and she was able to participate in therapies. Losartan was increased back to 25 mg daily. She returned to the facility 12/5/2023.   She was sent to the [facility] ED 1/30/2024 with an unresponsive episode. EMS reported seizure like activity lasting 10-15 mins. No seizure history. Video EEG was negative. CT head negative for acute pathology. ECHO: EF 25-30%. She was treated for possible  "cellulitis of her LLE and UTI. UC 1/31/2024 with <10,000 mixed organisms. She tested positive for COVID-19 and was not treated. Losartan, torsemide and spironolactone were decreased. Jardiance discontinued due to UTI. Psychiatry consulted and ordered zyprexa prn delirium. She returned to the facility 2/6/2024.   She was sent to the [facility] ED 2/7/2024 with unresponsive episode and BP 96/50. She was alert and interactive in the ED. Labs and VS were within range and she returned to the facility.     Today's concern is:      Dermatitis associated with incontinence  Recurrent UTI  Dementia with psychosis (H)  Paranoia (H)  Hallucinations  She is seen today to follow up on skin breakdown of her buttocks and qiana area related to incontinence and pressure. She was treated with a course of Cipro for possible UTI and cellulitis.   She is unable to provide history. Resting in bed and fairly cooperative. Talkative, but mostly nonsensical. Wheelchair bound and requires max assist with cares. Staff reports she's been more agitated and teary, resistive with cares.   Spoke with her  Madhu Weiss, who reports her mood has been poor with more anxiety and delusional thoughts.     Allergies, and PMH/PSH reviewed in EPIC today    REVIEW OF SYSTEMS:  Unable to obtain due to dementia. Positives as noted under HPI.       Objective:   /67   Pulse 84   Temp 97.3  F (36.3  C)   Resp 18   Ht 1.626 m (5' 4\")   Wt 64 kg (141 lb)   SpO2 97%   BMI 24.20 kg/m    GENERAL APPEARANCE:  Alert, in no distress  ENT:  [place]   EYES:  conjunctiva and lids normal  RESP:  lungs clear to auscultation   CV:  irregular rate and rhythm, no murmur, peripheral edema trace both LE  ABDOMEN:  soft, non-tender, no distension  M/S:  in bed. DICKERSON with appropriate strength. No joint inflammation   SKIN:  mild erythema of buttocks and qiana area, no open areas   PSYCH:  oriented to self, insight and judgement impaired, memory impaired, anxious "     ASSESSMENT / PLAN:  (L25.8,  R32) Dermatitis associated with incontinence  (primary encounter diagnosis)  Comment: skin improved, no open areas  Plan: continue Calmoseptine. Keep skin clean and dry     (N39.0) Recurrent UTI  Comment: suspected UTI, unable to obtain specimen due to incontinence. Completed Cipro 8/4/2024. No s/s of acute illness  Plan: monitor     (F03.92) Dementia with psychosis (H)  (F22) Paranoia (H)  (R44.3) Hallucinations  Comment: advanced disease with delusions, agitation, and resistiveness with cares. Low dose seroquel has been helpful, but symptoms worse in recent weeks   Plan: increase seroquel to 25 mg HS and 25 mg daily prn. Continue sertraline. Memory Care staff assistance with cares, meals, activities, med admin   agrees with plan of care  Discussed with staff         Electronically signed by: SHALONDA Kincaid CNP

## 2024-08-08 ENCOUNTER — ASSISTED LIVING VISIT (OUTPATIENT)
Dept: GERIATRICS | Facility: CLINIC | Age: 74
End: 2024-08-08
Payer: COMMERCIAL

## 2024-08-08 VITALS
TEMPERATURE: 97.3 F | BODY MASS INDEX: 24.07 KG/M2 | DIASTOLIC BLOOD PRESSURE: 67 MMHG | SYSTOLIC BLOOD PRESSURE: 122 MMHG | RESPIRATION RATE: 18 BRPM | HEART RATE: 84 BPM | HEIGHT: 64 IN | OXYGEN SATURATION: 97 % | WEIGHT: 141 LBS

## 2024-08-08 DIAGNOSIS — N39.0 RECURRENT UTI: ICD-10-CM

## 2024-08-08 DIAGNOSIS — F22 PARANOIA (H): ICD-10-CM

## 2024-08-08 DIAGNOSIS — L25.8 DERMATITIS ASSOCIATED WITH INCONTINENCE: Primary | ICD-10-CM

## 2024-08-08 DIAGNOSIS — R44.3 HALLUCINATIONS: ICD-10-CM

## 2024-08-08 DIAGNOSIS — R32 DERMATITIS ASSOCIATED WITH INCONTINENCE: Primary | ICD-10-CM

## 2024-08-08 DIAGNOSIS — F03.92 DEMENTIA WITH PSYCHOSIS (H): ICD-10-CM

## 2024-08-08 PROCEDURE — 99349 HOME/RES VST EST MOD MDM 40: CPT | Performed by: NURSE PRACTITIONER

## 2024-08-08 RX ORDER — QUETIAPINE FUMARATE 25 MG/1
TABLET, FILM COATED ORAL
Qty: 60 TABLET | Refills: 11 | Status: SHIPPED | OUTPATIENT
Start: 2024-08-08

## 2024-08-08 NOTE — PROGRESS NOTES
Rachel Weiss   1950    Orders 2024:  DISCONTINUE current seroquel orders, scheduled and prn  2. Start seroquel 25 mg po daily at 5 pm plus 25 mg po daily prn aggression/hallucinations/agitation  Sent to pharmacy  Discussed with     Jeffrey SHALONDA Niño CNP on 2024 at 3:02 PM

## 2024-08-08 NOTE — LETTER
" 2024      Rachel Weiss  [facility]   [address]  [facility] [address]        [facility] GERIATRICS    Chief Complaint   Patient presents with     RECHECK     HPI:  Rachel Weiss is a 74 year old  (1950), who is being seen today for an episodic care visit at: THE [facility] ([facility]) [[id number]]. Today's concern is: ***    Allergies, and PMH/PSH reviewed in [facility] today.  REVIEW OF SYSTEMS:  {ipderh27:652558}    Objective:   /67   Pulse 84   Temp 97.3  F (36.3  C)   Resp 18   Ht 1.626 m (5' 4\")   Wt 64 kg (141 lb)   SpO2 97%   BMI 24.20 kg/m    {Nursing home physical exam :757643}    {fgslab:650396}    Assessment/Plan:  {FGS DX2:994889}    MED REC REQUIRED{TIP  Click the link below to document or use med rec list, use list to pull in response :017894}  Post Medication Reconciliation Status: {MED REC LIST:045152}      Orders:  {fgsorders:392692}  ***    Electronically signed by: Phyllis Willard MA ***        Rachel Weiss   1950    Orders 2024:  DISCONTINUE current seroquel orders, scheduled and prn  2. Start seroquel 25 mg po daily at 5 pm plus 25 mg po daily prn aggression/hallucinations/agitation  Sent to pharmacy  Discussed with     SHALONDA Kincaid CNP on 2024 at 3:02 PM      Sincerely,        SHALONDA Kincaid CNP      "

## 2024-08-31 DIAGNOSIS — I50.9 CONGESTIVE HEART FAILURE, UNSPECIFIED HF CHRONICITY, UNSPECIFIED HEART FAILURE TYPE (H): ICD-10-CM

## 2024-09-03 RX ORDER — CHOLECALCIFEROL (VITAMIN D3) 50 MCG
1 TABLET ORAL DAILY
Qty: 90 TABLET | Refills: 97 | Status: SHIPPED | OUTPATIENT
Start: 2024-09-03

## 2024-09-03 RX ORDER — DIGOXIN 125 MCG
125 TABLET ORAL DAILY
Qty: 90 TABLET | Refills: 97 | Status: SHIPPED | OUTPATIENT
Start: 2024-09-03

## 2024-09-03 RX ORDER — ROSUVASTATIN CALCIUM 10 MG/1
10 TABLET, COATED ORAL DAILY
Qty: 90 TABLET | Refills: 97 | Status: SHIPPED | OUTPATIENT
Start: 2024-09-03

## 2024-09-03 RX ORDER — SPIRONOLACTONE 25 MG/1
TABLET ORAL
Qty: 45 TABLET | Refills: 97 | Status: SHIPPED | OUTPATIENT
Start: 2024-09-03

## 2024-09-03 RX ORDER — METOPROLOL SUCCINATE 25 MG/1
25 TABLET, EXTENDED RELEASE ORAL DAILY
Qty: 90 TABLET | Refills: 97 | Status: SHIPPED | OUTPATIENT
Start: 2024-09-03

## 2024-09-04 DIAGNOSIS — R07.9 CHEST PAIN: Primary | ICD-10-CM

## 2024-09-04 RX ORDER — NITROGLYCERIN 0.4 MG/1
TABLET SUBLINGUAL
Qty: 25 TABLET | Refills: 97 | Status: SHIPPED | OUTPATIENT
Start: 2024-09-04

## 2024-09-24 ENCOUNTER — ASSISTED LIVING VISIT (OUTPATIENT)
Dept: GERIATRICS | Facility: CLINIC | Age: 74
End: 2024-09-24
Payer: COMMERCIAL

## 2024-09-24 VITALS
WEIGHT: 150 LBS | HEART RATE: 80 BPM | SYSTOLIC BLOOD PRESSURE: 124 MMHG | BODY MASS INDEX: 25.75 KG/M2 | RESPIRATION RATE: 16 BRPM | TEMPERATURE: 97.3 F | DIASTOLIC BLOOD PRESSURE: 77 MMHG

## 2024-09-24 DIAGNOSIS — N18.31 TYPE 2 DIABETES MELLITUS WITH STAGE 3A CHRONIC KIDNEY DISEASE, WITHOUT LONG-TERM CURRENT USE OF INSULIN (H): ICD-10-CM

## 2024-09-24 DIAGNOSIS — L25.8 DERMATITIS ASSOCIATED WITH INCONTINENCE: Primary | ICD-10-CM

## 2024-09-24 DIAGNOSIS — I10 PRIMARY HYPERTENSION: ICD-10-CM

## 2024-09-24 DIAGNOSIS — I89.0 LYMPHEDEMA: ICD-10-CM

## 2024-09-24 DIAGNOSIS — R44.3 HALLUCINATIONS: ICD-10-CM

## 2024-09-24 DIAGNOSIS — R32 DERMATITIS ASSOCIATED WITH INCONTINENCE: Primary | ICD-10-CM

## 2024-09-24 DIAGNOSIS — F03.92 DEMENTIA WITH PSYCHOSIS (H): ICD-10-CM

## 2024-09-24 DIAGNOSIS — I50.20 HFREF (HEART FAILURE WITH REDUCED EJECTION FRACTION) (H): ICD-10-CM

## 2024-09-24 DIAGNOSIS — I48.20 CHRONIC ATRIAL FIBRILLATION (H): ICD-10-CM

## 2024-09-24 DIAGNOSIS — E11.22 TYPE 2 DIABETES MELLITUS WITH STAGE 3A CHRONIC KIDNEY DISEASE, WITHOUT LONG-TERM CURRENT USE OF INSULIN (H): ICD-10-CM

## 2024-09-24 DIAGNOSIS — F22 PARANOIA (H): ICD-10-CM

## 2024-09-24 PROCEDURE — 99349 HOME/RES VST EST MOD MDM 40: CPT | Performed by: NURSE PRACTITIONER

## 2024-09-24 NOTE — LETTER
9/24/2024      Rachel Rasheed [address] [facility] [address]        No notes on file      Sincerely,        SHALONDA Kincaid CNP

## 2024-09-24 NOTE — PROGRESS NOTES
[facility] GERIATRICS    Chief Complaint   Patient presents with    RECHECK     HPI:  Rachel Weiss is a 74 year old  (1950), who is being seen today for an episodic care visit at: THE [facility] ([facility]) [[id number]].   She admitted to Memory Care at this facility 9/26/2023 for a higher level of care following hospitalization and tcu stay.   Medical history significant for vascular dementia, HFrEF, afib, CAD with STEMI and stent to LAD 11/2021, ischemic cardiomyopathy, PVD, lymphedema, diabetes type 2, CKD stage 3, HTN, asthma, rheumatoid arthritis, depression, anxiety.   She was hospitalized at [facility] 9/6-9/13/2023 with acute on chronic CHF and was found to be in afib with RVR. She was diuresed with lasix IV and discharged on torsemide (increased from 60 mg daily to 60 mg in am and 40 mg in pm). Empagliflozin was started. Digoxin dose was increased and metoprolol continued for afib. She was diuresed for 14 lbs with weight 181 lbs at discharge. Estimated dry weight ~182 lbs. She discharged to [facility] TCU where she made fair progress in therapies. Torsemide was decreased to 40 mg bid and losartan decreased to 12.5 mg daily for hypotension and weight 177 lbs. She received cipro for UTI.   She was sent to the [facility] ED 11/28/2023 with acute onset of severe left hip pain. CT showed inflammation of the left iliopsoas muscle. She was unable to tolerate MRI. Ortho was concerned for occult fracture and plan was for MRI under general anesthesia, which was cancelled by Anesthesia due to potential risk. Pain spontaneously resolved and she was able to participate in therapies. Losartan was increased back to 25 mg daily. She returned to the facility 12/5/2023.   She was sent to the [facility] ED 1/30/2024 with an unresponsive episode. EMS reported seizure like activity lasting 10-15 mins. No seizure history. Video EEG was negative. CT head negative for acute pathology. ECHO: EF 25-30%. She was treated for possible  "cellulitis of her LLE and UTI. UC 1/31/2024 with <10,000 mixed organisms. She tested positive for COVID-19 and was not treated. Losartan, torsemide and spironolactone were decreased. Jardiance discontinued due to UTI. Psychiatry consulted and ordered zyprexa prn delirium. She returned to the facility 2/6/2024.   She was sent to the [facility] ED 2/7/2024 with unresponsive episode and BP 96/50. She was alert and interactive in the ED. Labs and VS were within range and she returned to the facility.     Today's concern is:      Dermatitis associated with incontinence  Dementia with psychosis (H)  Paranoia (H)  Hallucinations  Chronic atrial fibrillation (H)  HFrEF (heart failure with reduced ejection fraction) (H)  Lymphedema  Type 2 diabetes mellitus with stage 3a chronic kidney disease, without long-term current use of insulin (H)  Primary hypertension  She is seen today to follow up on multiple medical issues and skin breakdown of her buttocks and qiana area. She is unable to provide history. Resting in bed. Pleasant and talkative, but repetitive. Denies pain. Wheelchair bound and requires max assist with cares. Staff reports her mood has been good, no significant behaviors.   Spoke with her  Madhu, who feels she's doing \"the best she can.\" He's pleased with the improvement in her skin and care provided by staff.     Allergies, and PMH/PSH reviewed in EPIC today    REVIEW OF SYSTEMS:  Unable to obtain due to dementia. Positives as noted under HPI.       Objective:   /77   Pulse 80   Temp 97.3  F (36.3  C)   Resp 16   Wt 68 kg (150 lb)   BMI 25.75 kg/m    GENERAL APPEARANCE:  Alert, in no distress  ENT:  [place]   EYES:  conjunctiva and lids normal  RESP:  lungs clear to auscultation   CV:  irregular rate and rhythm, no murmur, peripheral edema trace both LE  ABDOMEN:  soft, non-tender, no distension  M/S:  in bed. DICKERSON with appropriate strength. No joint inflammation   SKIN: faint pinkness of qiana area, no " excoriation, skin intact   PSYCH:  oriented to self, insight and judgement impaired, memory impaired, anxious       ASSESSMENT / PLAN:  (L25.8,  R32) Dermatitis associated with incontinence  (primary encounter diagnosis)  Comment: much improved   Plan: continue calmoseptine. Keep skin clean and dry, frequent changes of incontinent brief    (F03.92) Dementia with psychosis (H)  (F22) Paranoia (H)  (R44.3) Hallucinations  Comment: severe deficits. Hallucination, agitation and mood improved after increase in seroquel last month   Plan: continue seroquel 25 mg HS and daily prn. Continue sertraline. Memory Care staff assistance with cares, meals, activities, med admin    (I48.20) Chronic atrial fibrillation (H)  Comment: rate controlled in the 80s   Plan: continue metoprolol, digoxin, apixaban     (I50.20) HFrEF (heart failure with reduced ejection fraction) (H)  (I89.0) Lymphedema  Comment: most recent weight is up 10 lbs, may not be accurate. She appears euvolemic   Plan: continue spironolactone, torsemide. Follow weight, symptoms. Labs with next visit     (E11.22,  N18.31) Type 2 diabetes mellitus with stage 3a chronic kidney disease, without long-term current use of insulin (H)  Comment: controlled with HgbA1c 6.6 on 5/9/2204  Trulicity dc'd due to hypoglycemia. Jardiance dc'd due to UTI   Plan: continue metformin. Follow HgbA1c     (I10) Primary hypertension  Comment: controlled with BPs: 124/77, 116/70, 122/67  Plan: continue metoprolol, spironolactone, torsemide         Electronically signed by: SHALONDA Kincaid CNP

## 2024-10-31 DIAGNOSIS — R52 PAIN: ICD-10-CM

## 2024-10-31 RX ORDER — ACETAMINOPHEN 325 MG/1
650 TABLET ORAL EVERY 4 HOURS PRN
Qty: 30 TABLET | Refills: 11 | Status: SHIPPED | OUTPATIENT
Start: 2024-10-31

## 2024-11-18 NOTE — PROGRESS NOTES
[facility] GERIATRICS    Chief Complaint   Patient presents with    RECHECK     HPI:  Rachel Weiss is a 74 year old  (1950), who is being seen today for an episodic care visit at: [facility]) [[id number]]. Today's concern is: ***    Allergies, and PM[facility]H [facility].  REVIEW OF SYSTEMS:  {hgsfxz96:420634}    Objective:   /75   Pulse 76   Temp 97.8  F (36.6  C)   Resp 18   Wt 66.7 kg (147 lb)   BMI 25.23 kg/m    {Nursing home physical exam :486885}    {fgslab:986182}    Assessment/Plan:  {S DX2:532028}    MED REC REQUIRED{TIP  Click the link below to document or use med rec list, use list to pull in response :774349}  Post Medication Reconciliation Status: {MED REC LIST:570716}      Orders:  {fgsorders:222762}  ***    Electronically signed by: Stefany Davis ***

## 2024-11-19 ENCOUNTER — ASSISTED LIVING VISIT (OUTPATIENT)
Dept: GERIATRICS | Facility: CLINIC | Age: 74
End: 2024-11-19
Payer: COMMERCIAL

## 2024-11-19 ENCOUNTER — LAB REQUISITION (OUTPATIENT)
Dept: LAB | Facility: CLINIC | Age: 74
End: 2024-11-19
Payer: COMMERCIAL

## 2024-11-19 VITALS
WEIGHT: 147 LBS | TEMPERATURE: 97.8 F | SYSTOLIC BLOOD PRESSURE: 112 MMHG | BODY MASS INDEX: 25.23 KG/M2 | HEART RATE: 76 BPM | DIASTOLIC BLOOD PRESSURE: 75 MMHG | RESPIRATION RATE: 18 BRPM

## 2024-11-19 DIAGNOSIS — E11.8 TYPE 2 DIABETES MELLITUS WITH UNSPECIFIED COMPLICATIONS (H): ICD-10-CM

## 2024-11-19 DIAGNOSIS — I48.0 PAROXYSMAL ATRIAL FIBRILLATION (H): ICD-10-CM

## 2024-11-19 DIAGNOSIS — I50.9 CONGESTIVE HEART FAILURE, UNSPECIFIED HF CHRONICITY, UNSPECIFIED HEART FAILURE TYPE (H): ICD-10-CM

## 2024-11-19 DIAGNOSIS — I50.40 UNSPECIFIED COMBINED SYSTOLIC (CONGESTIVE) AND DIASTOLIC (CONGESTIVE) HEART FAILURE (H): ICD-10-CM

## 2024-11-19 RX ORDER — TORSEMIDE 20 MG/1
20 TABLET ORAL DAILY
Qty: 30 TABLET | Refills: 11 | Status: SHIPPED | OUTPATIENT
Start: 2024-11-19

## 2024-11-19 NOTE — LETTER
11/19/2024      Rachel Rasheed [address] [facility] [address]        No notes on file      Sincerely,        SHALONDA Kincaid CNP

## 2024-11-21 LAB
ANION GAP SERPL CALCULATED.3IONS-SCNC: 13 MMOL/L (ref 7–15)
BUN SERPL-MCNC: 23.9 MG/DL (ref 8–23)
CALCIUM SERPL-MCNC: 9.7 MG/DL (ref 8.8–10.4)
CHLORIDE SERPL-SCNC: 98 MMOL/L (ref 98–107)
CREAT SERPL-MCNC: 0.92 MG/DL (ref 0.51–0.95)
DIGOXIN SERPL-MCNC: 0.6 NG/ML (ref 0.6–1.2)
EGFRCR SERPLBLD CKD-EPI 2021: 64 ML/MIN/1.73M2
ERYTHROCYTE [DISTWIDTH] IN BLOOD BY AUTOMATED COUNT: 16.5 % (ref 10–15)
EST. AVERAGE GLUCOSE BLD GHB EST-MCNC: 143 MG/DL
GLUCOSE SERPL-MCNC: 92 MG/DL (ref 70–99)
HBA1C MFR BLD: 6.6 %
HCO3 SERPL-SCNC: 26 MMOL/L (ref 22–29)
HCT VFR BLD AUTO: 43.4 % (ref 35–47)
HGB BLD-MCNC: 13.8 G/DL (ref 11.7–15.7)
MCH RBC QN AUTO: 28.6 PG (ref 26.5–33)
MCHC RBC AUTO-ENTMCNC: 31.8 G/DL (ref 31.5–36.5)
MCV RBC AUTO: 90 FL (ref 78–100)
PLATELET # BLD AUTO: 216 10E3/UL (ref 150–450)
POTASSIUM SERPL-SCNC: 4.2 MMOL/L (ref 3.4–5.3)
RBC # BLD AUTO: 4.83 10E6/UL (ref 3.8–5.2)
SODIUM SERPL-SCNC: 137 MMOL/L (ref 135–145)
WBC # BLD AUTO: 6.7 10E3/UL (ref 4–11)

## 2024-11-21 PROCEDURE — P9604 ONE-WAY ALLOW PRORATED TRIP: HCPCS | Mod: ORL | Performed by: NURSE PRACTITIONER

## 2024-11-21 PROCEDURE — 85027 COMPLETE CBC AUTOMATED: CPT | Mod: ORL | Performed by: NURSE PRACTITIONER

## 2024-11-21 PROCEDURE — 83036 HEMOGLOBIN GLYCOSYLATED A1C: CPT | Mod: ORL | Performed by: NURSE PRACTITIONER

## 2024-11-21 PROCEDURE — 80048 BASIC METABOLIC PNL TOTAL CA: CPT | Mod: ORL | Performed by: NURSE PRACTITIONER

## 2024-11-21 PROCEDURE — 80162 ASSAY OF DIGOXIN TOTAL: CPT | Mod: ORL | Performed by: NURSE PRACTITIONER

## 2024-11-21 PROCEDURE — 36415 COLL VENOUS BLD VENIPUNCTURE: CPT | Mod: ORL | Performed by: NURSE PRACTITIONER

## 2024-12-10 ENCOUNTER — ASSISTED LIVING VISIT (OUTPATIENT)
Dept: GERIATRICS | Facility: CLINIC | Age: 74
End: 2024-12-10
Payer: COMMERCIAL

## 2024-12-10 VITALS
DIASTOLIC BLOOD PRESSURE: 97 MMHG | RESPIRATION RATE: 18 BRPM | TEMPERATURE: 97 F | SYSTOLIC BLOOD PRESSURE: 137 MMHG | WEIGHT: 157 LBS | HEART RATE: 87 BPM | BODY MASS INDEX: 26.95 KG/M2

## 2024-12-10 NOTE — LETTER
12/10/2024      Rachel Rasheed [address] [facility] [address]        No notes on file      Sincerely,        SHALONDA Kincaid CNP

## 2024-12-10 NOTE — PROGRESS NOTES
[facility] GERIATRICS    Chief Complaint   Patient presents with    RECHECK     HPI:  Rachel Weiss is a 74 year old  (1950), who is being seen today for an episodic care visit at: [facility] ([facility]) [[id number]]. Today's concern is: ***    Allergies, and PMH/PSH reviewed in [facility] today.  REVIEW OF SYSTEMS:  {vvgiwa67:309118}    Objective:   BP (!) 137/97   Pulse 87   Temp 97  F (36.1  C)   Resp 18   Wt 71.2 kg (157 lb)   BMI 26.95 kg/m    {Nursing home physical exam :088131}    {fgslab:108977}    Assessment/Plan:  {S DX2:885635}    MED REC REQUIRED{TIP  Click the link below to document or use med rec list, use list to pull in response :954351}  Post Medication Reconciliation Status: {MED REC LIST:389983}      Orders:  {fgsorders:653594}  ***    Electronically signed by: Stefany Davis ***

## 2025-01-21 DIAGNOSIS — I50.20 HFREF (HEART FAILURE WITH REDUCED EJECTION FRACTION) (H): ICD-10-CM

## 2025-01-21 DIAGNOSIS — F41.8 DEPRESSION WITH ANXIETY: ICD-10-CM

## 2025-01-21 DIAGNOSIS — I48.20 CHRONIC ATRIAL FIBRILLATION (H): ICD-10-CM

## 2025-01-21 RX ORDER — POTASSIUM CHLORIDE 1500 MG/1
20 TABLET, EXTENDED RELEASE ORAL DAILY
Qty: 31 TABLET | Refills: 11 | Status: SHIPPED | OUTPATIENT
Start: 2025-01-21

## 2025-01-21 RX ORDER — APIXABAN 5 MG/1
TABLET, FILM COATED ORAL
Qty: 62 TABLET | Refills: 11 | Status: SHIPPED | OUTPATIENT
Start: 2025-01-21

## 2025-02-03 ENCOUNTER — TELEPHONE (OUTPATIENT)
Dept: GERIATRICS | Facility: CLINIC | Age: 75
End: 2025-02-03
Payer: COMMERCIAL

## 2025-02-03 NOTE — TELEPHONE ENCOUNTER
[facility]iatric[facility] Triage Nurse Telephone Encounter    Provider: SHALONDA Suero CNP   Facility: [facility] Facility Type:  AL    Caller: Cony  Call Back Number: [phone]    Allergies:    Allergies   Allergen Reactions    Shellfish Allergy Shortness Of Breath, Anaphylaxis and Hives    Shellfish-Derived Products Shortness Of Breath, Anaphylaxis and Hives    Cefuroxime Other (See Comments)     Odd throat sensations - mucus, trouble swallowing (10/2013)    Celecoxib GI Disturbance    Levofloxacin Dizziness, Diarrhea and GI Disturbance     And arthralgia    Amoxicillin Unknown    Azithromycin Hives    Citalopram Other (See Comments)     Flushing    Doxycycline GI Disturbance     Bloating    Lodine [Etodolac] Unknown    Nitrofurantoin Other (See Comments)     Paresthesias    Penicillins Hives and Rash    Sulfa Antibiotics Hives     Takes Methotrexate    Sulfamethoxazole-Trimethoprim Rash    Sulindac Swelling     Swelling in hands plus gas, bloating, and loss of appetite    Valacyclovir Diarrhea        Reason for call: Nurse is calling to report that patient is having increased bilateral LE edema up to 3-4+ pitting.  Patient is also visibly short of breath and tachypneic.  Lung auscultated crackles in the left base.  Patient is uncooperative with VS.  Nurse was able to apply the oximeter, but was unable to get a reading due to cold hands.  Weight today after breakfast is 156.3#, which is up 3# from the previous weights.  Notable meds:  Torsemide 20mg daily, Spironolactone 12.5mg daily, potassium 20meq daily.        Addendum:  Patient's son did not want patient to go to the ER for evaluation.  He wanted diuretics adjusted.        Verbal Order/Direction given by Provider: Send patient to the ER due to shortness of breath, tachypnea, increased LE edema, and weight gain.      Addendum orders:  Torsemide 20mg x 1 dose now.  Starting 2/4/25, give Torsemide 40mg daily.  Check VS Q shift.   Daily weights x 5 days and if stable, go back to weekly.  Check BMP and digoxin level on 2/6/25.          Provider giving Order:  KYLE West    Verbal Order given to: Cony Wiggins RN

## 2025-02-04 ENCOUNTER — LAB REQUISITION (OUTPATIENT)
Dept: LAB | Facility: CLINIC | Age: 75
End: 2025-02-04
Payer: COMMERCIAL

## 2025-02-04 DIAGNOSIS — R60.0 LOCALIZED EDEMA: ICD-10-CM

## 2025-02-06 LAB
ANION GAP SERPL CALCULATED.3IONS-SCNC: 16 MMOL/L (ref 7–15)
BUN SERPL-MCNC: 20.2 MG/DL (ref 8–23)
CALCIUM SERPL-MCNC: 9.4 MG/DL (ref 8.8–10.4)
CHLORIDE SERPL-SCNC: 101 MMOL/L (ref 98–107)
CREAT SERPL-MCNC: 1.03 MG/DL (ref 0.51–0.95)
DIGOXIN SERPL-MCNC: 0.5 NG/ML (ref 0.6–1.2)
EGFRCR SERPLBLD CKD-EPI 2021: 56 ML/MIN/1.73M2
GLUCOSE SERPL-MCNC: 105 MG/DL (ref 70–99)
HCO3 SERPL-SCNC: 24 MMOL/L (ref 22–29)
POTASSIUM SERPL-SCNC: 4.2 MMOL/L (ref 3.4–5.3)
SODIUM SERPL-SCNC: 141 MMOL/L (ref 135–145)

## 2025-02-06 PROCEDURE — P9603 ONE-WAY ALLOW PRORATED MILES: HCPCS | Mod: ORL | Performed by: NURSE PRACTITIONER

## 2025-02-06 PROCEDURE — 36415 COLL VENOUS BLD VENIPUNCTURE: CPT | Mod: ORL | Performed by: NURSE PRACTITIONER

## 2025-02-06 PROCEDURE — 80162 ASSAY OF DIGOXIN TOTAL: CPT | Mod: ORL | Performed by: NURSE PRACTITIONER

## 2025-02-06 PROCEDURE — 80048 BASIC METABOLIC PNL TOTAL CA: CPT | Mod: ORL | Performed by: NURSE PRACTITIONER

## 2025-02-11 ENCOUNTER — ASSISTED LIVING VISIT (OUTPATIENT)
Dept: GERIATRICS | Facility: CLINIC | Age: 75
End: 2025-02-11
Payer: COMMERCIAL

## 2025-02-11 VITALS
DIASTOLIC BLOOD PRESSURE: 93 MMHG | TEMPERATURE: 97.7 F | RESPIRATION RATE: 20 BRPM | SYSTOLIC BLOOD PRESSURE: 128 MMHG | HEART RATE: 83 BPM | BODY MASS INDEX: 26.47 KG/M2 | WEIGHT: 154.2 LBS

## 2025-02-11 DIAGNOSIS — I10 PRIMARY HYPERTENSION: ICD-10-CM

## 2025-02-11 DIAGNOSIS — I89.0 LYMPHEDEMA: ICD-10-CM

## 2025-02-11 DIAGNOSIS — R44.3 HALLUCINATIONS: ICD-10-CM

## 2025-02-11 DIAGNOSIS — I48.20 CHRONIC ATRIAL FIBRILLATION (H): ICD-10-CM

## 2025-02-11 DIAGNOSIS — N18.31 TYPE 2 DIABETES MELLITUS WITH STAGE 3A CHRONIC KIDNEY DISEASE, WITHOUT LONG-TERM CURRENT USE OF INSULIN (H): ICD-10-CM

## 2025-02-11 DIAGNOSIS — F03.92 DEMENTIA WITH PSYCHOSIS (H): ICD-10-CM

## 2025-02-11 DIAGNOSIS — E11.22 TYPE 2 DIABETES MELLITUS WITH STAGE 3A CHRONIC KIDNEY DISEASE, WITHOUT LONG-TERM CURRENT USE OF INSULIN (H): ICD-10-CM

## 2025-02-11 DIAGNOSIS — I50.20 HFREF (HEART FAILURE WITH REDUCED EJECTION FRACTION) (H): Primary | ICD-10-CM

## 2025-02-11 PROCEDURE — 99349 HOME/RES VST EST MOD MDM 40: CPT | Performed by: NURSE PRACTITIONER

## 2025-02-11 NOTE — LETTER
2/11/2025      Rachel Rasheed [address] [facility] [address]        No notes on file      Sincerely,        SHALONDA Kincaid CNP    Electronically signed

## 2025-02-11 NOTE — PROGRESS NOTES
[facility] GERIATRICS    Chief Complaint   Patient presents with    RECHECK     HPI:  Rachel Weiss is a 74 year old  (1950), who is being seen today for an episodic care visit at: [facility] ([facility]) [[id number]]. Today's concern is: ***    Allergies, and PMH/PSH reviewed in [facility] today.  REVIEW OF SYSTEMS:  {tteefv82:016267}    Objective:   BP (!) 128/93   Pulse 83   Temp 97.7  F (36.5  C)   Resp 20   Wt 69.9 kg (154 lb 3.2 oz)   BMI 26.47 kg/m    {Nursing home physical exam :201667}    {fgslab:637035}    Assessment/Plan:  {S DX2:203314}    MED REC REQUIRED{TIP  Click the link below to document or use med rec list, use list to pull in response :868179}  Post Medication Reconciliation Status: {MED REC LIST:514514}      Orders:  {fgsorders:648154}  ***    Electronically signed by: Stefany Davis ***       cellulitis of her LLE and UTI. UC 1/31/2024 with <10,000 mixed organisms. She tested positive for COVID-19 and was not treated. Losartan, torsemide and spironolactone were decreased. Jardiance discontinued due to UTI. Psychiatry consulted and ordered zyprexa prn delirium. She returned to the facility 2/6/2024.   She was sent to the [facility] ED 2/7/2024 with unresponsive episode and BP 96/50. She was alert and interactive in the ED. Labs and VS were within range and she returned to the facility.       Today's concern is:      HFrEF (heart failure with reduced ejection fraction) (H)  Lymphedema  Dementia with psychosis (H)  Hallucinations  Chronic atrial fibrillation (H)  Type 2 diabetes mellitus with stage 3a chronic kidney disease, without long-term current use of insulin (H)  Primary hypertension  She is seen for follow up of multiple medical issues. She's unable to provide history. Pleasant and cooperative, conversation is mostly nonsensical. Good appetite. Wheelchair bound and requires max assist of 1-2 with cares. Feeds herself with set up and supervision. Staff reports her mood has been good, no hallucinations or agitation   Torsemide was increased 2/3/2025 for worsening LE edema. Weights over the past week have ranged 154-176 lbs.   Spoke with her  Madhu, who reports edema was less during his recent visit. She was also more alert and interactive.     Allergies, and PMH/PSH reviewed in [facility]IC today    REVIEW OF SYSTEMS:  Unable to obtain due to dementia. Positives as noted under HPI.       Objective:   BP (!) 128/93   Pulse 83   Temp 97.7  F (36.5  C)   Resp 20   Wt 69.9 kg (154 lb 3.2 oz)   BMI 26.47 kg/m    GENERAL APPEARANCE:  Alert, in no distress  ENT:  [place]   EYES:  conjunctiva and lids normal  RESP:  lungs clear to auscultation, no crackles or wheezes   CV:  irregular rate and rhythm, no murmur, 1+ bilateral LE edema   ABDOMEN:  soft, non-tender, no distension  M/S:  wheelchair. DICKERSON with appropriate  strength. No joint inflammation   SKIN:  no visible rashes or open areas   PSYCH:  oriented to self, insight and judgement impaired, memory impaired, affect normal         ASSESSMENT / PLAN:  (I50.20) HFrEF (heart failure with reduced ejection fraction) (H)  (primary encounter diagnosis)  (I89.0) Lymphedema  Comment: improved LE edema, appears euvolemic on exam  Weights aren't accurate  Plan: continue torsemide 40 mg daily, spironolactone 12.5 mg daily, metoprolol ER 25 mg daily. BMP. Elevate legs. Follow weight, symptoms   Discussed with staff     (F03.92) Dementia with psychosis (H)  (R44.3) Hallucinations  Comment: severe deficits.  Hallucinations and agitation well managed   Plan: continue sertraline 50 mg daily, seroquel 25 mg every evening. Memory Care staff assistance with cares, meals, activities, med admin    (I48.20) Chronic atrial fibrillation (H)  Comment: rate controlled: 82-84  Plan: continue apixaban 5 mg bid, digoxin 0.125 mg daily, metoprolol ER 25 mg daily.     (E11.22,  N18.31) Type 2 diabetes mellitus with stage 3a chronic kidney disease, without long-term current use of insulin (H)  Comment: controlled with HgbA1c 6.6 on 11/21/2024  Jardiance dc'd due to recurrent UTI. Trulicity dc'd due to hypoglycemia  Plan: continue metformin 1000 mg bid. Follow HgbA1c     (I10) Primary hypertension  Comment: controlled with BPs: 128/93, 126/81, 127/82    Plan: continue torsemide, spironolactone, metoprolol           Electronically signed by: SHALONDA Kincaid CNP

## 2025-02-18 ENCOUNTER — LAB REQUISITION (OUTPATIENT)
Dept: LAB | Facility: CLINIC | Age: 75
End: 2025-02-18
Payer: COMMERCIAL

## 2025-02-18 ENCOUNTER — ASSISTED LIVING VISIT (OUTPATIENT)
Dept: GERIATRICS | Facility: CLINIC | Age: 75
End: 2025-02-18
Payer: COMMERCIAL

## 2025-02-18 VITALS
TEMPERATURE: 98 F | WEIGHT: 159 LBS | DIASTOLIC BLOOD PRESSURE: 73 MMHG | SYSTOLIC BLOOD PRESSURE: 114 MMHG | BODY MASS INDEX: 27.29 KG/M2 | HEART RATE: 111 BPM | RESPIRATION RATE: 22 BRPM

## 2025-02-18 DIAGNOSIS — I50.20 HFREF (HEART FAILURE WITH REDUCED EJECTION FRACTION) (H): ICD-10-CM

## 2025-02-18 DIAGNOSIS — J18.8 OTHER PNEUMONIA, UNSPECIFIED ORGANISM: ICD-10-CM

## 2025-02-18 DIAGNOSIS — I48.20 CHRONIC ATRIAL FIBRILLATION (H): ICD-10-CM

## 2025-02-18 DIAGNOSIS — I10 PRIMARY HYPERTENSION: ICD-10-CM

## 2025-02-18 DIAGNOSIS — I89.0 LYMPHEDEMA: ICD-10-CM

## 2025-02-18 DIAGNOSIS — F03.92 DEMENTIA WITH PSYCHOSIS (H): ICD-10-CM

## 2025-02-18 DIAGNOSIS — N17.0 ACUTE KIDNEY FAILURE WITH TUBULAR NECROSIS: ICD-10-CM

## 2025-02-18 DIAGNOSIS — J18.9 PNEUMONIA OF RIGHT LOWER LOBE DUE TO INFECTIOUS ORGANISM: Primary | ICD-10-CM

## 2025-02-18 DIAGNOSIS — R44.3 HALLUCINATIONS: ICD-10-CM

## 2025-02-18 PROCEDURE — 99349 HOME/RES VST EST MOD MDM 40: CPT | Performed by: NURSE PRACTITIONER

## 2025-02-18 NOTE — PROGRESS NOTES
[facility] GERIATRICS    Chief Complaint   Patient presents with    RECHECK     HPI:  Rachel Weiss is a 74 year old  (1950), who is being seen today for an episodic care visit at: [facility]) [[id number]]. Today's concern is: ***    Allergies, and PMH/PSH reviewed in [facility] today.  REVIEW OF SYSTEMS:  {yugume13:793573}    Objective:   /73   Pulse 111   Temp 98  F (36.7  C)   Resp 22   Wt 72.1 kg (159 lb)   BMI 27.29 kg/m    {Nursing home physical exam :481243}    {fgslab:230514}    Assessment/Plan:  {FGS DX2:238521}    MED REC REQUIRED{TIP  Click the link below to document or use med rec list, use list to pull in response :768903}  Post Medication Reconciliation Status: {MED REC LIST:701318}      Orders:  {fgsorders:892536}  ***    Electronically signed by: Stefany Davis ***       "cellulitis of her LLE and UTI. UC 1/31/2024 with <10,000 mixed organisms. She tested positive for COVID-19 and was not treated. Losartan, torsemide and spironolactone were decreased. Jardiance discontinued due to UTI. Psychiatry consulted and ordered zyprexa prn delirium. She returned to the facility 2/6/2024.   She was sent to the [facility] ED 2/7/2024 with unresponsive episode and BP 96/50. She was alert and interactive in the ED. Labs and VS were within range and she returned to the facility.       Today's concern is:      Pneumonia of right lower lobe due to infectious organism  HFrEF (heart failure with reduced ejection fraction) (H)  Lymphedema  Chronic atrial fibrillation (H)  Dementia with psychosis (H)  Hallucinations  Primary hypertension  She is seen for follow up of pneumonia. She developed shortness of breath and abnormal lung sounds on exam 2/13/2025 and CXR showed right basilar pneumonia with small right pleural effusion. COVID-19, influenza A & B negative. Cefdinir and doxycycline were started 2/14/2025.   She is a poor historian and conversation is mostly nonsensical. She's alert and wheeling herself about the unit per usual. \"I'm ok.\" No respiratory symptoms noted by staff. Remains afebrile, no hypoxia. Good appetite at breakfast.   Spoke with her  Madhu, who reports she seemed back to baseline when he visited last night. He noted improvement in her LE edema.     Allergies, and PMH/PSH reviewed in EPIC today    REVIEW OF SYSTEMS:  Unable to obtain due to dementia. Positives as noted under HPI.       Objective:   /73   Pulse 111   Temp 98  F (36.7  C)   Resp 22   Wt 72.1 kg (159 lb)   BMI 27.29 kg/m    GENERAL APPEARANCE:  Alert, in no distress  ENT:  [facility]   EYES:  conjunctiva and lids normal  RESP:  lungs clear to auscultation, no crackles or wheezes   CV:  irregular rate and rhythm, no murmur, trace bilateral LE edema   ABDOMEN:  soft, non-tender, no distension  M/S:  wheelchair. DICKERSON " with appropriate strength. No joint inflammation   SKIN:  no visible rashes or open areas   PSYCH:  oriented to self, insight and judgement impaired, memory impaired, affect normal       ASSESSMENT / PLAN:  (J18.9) Pneumonia of right lower lobe due to infectious organism  (primary encounter diagnosis)  Comment: appears to be resolving   Plan: complete 5 day course of cefdinir and doxycycline. CBC, BMP. Closely monitor symptoms, temp, O2 sats.   Discussed with staff     (I50.20) HFrEF (heart failure with reduced ejection fraction) (H)  (I89.0) Lymphedema  Comment: appears euvolemic, improved LE edema   Plan: continue torsemide 40 mg daily, spironolactone 12.5 mg daily, metoprolol ER 25 mg daily. Follow weight, symptoms     (I48.20) Chronic atrial fibrillation (H)  Comment: rate controlled: 82-83, outlier 111  Plan: continue apixaban, digoxin, metoprolol. Monitor VS    (F03.92) Dementia with psychosis (H)  (R44.3) Hallucinations  Comment: severe deficits. She appears at baseline today   Plan: continue sertraline, seroquel. Memory Care staff assistance with cares, meals, activities, med admin    (I10) Primary hypertension  Comment: recent BPs: 114/73, 128/93, 126/81   Plan: continue torsemide, spironolactone, metoprolol             Electronically signed by: SHALONDA Kincaid CNP

## 2025-02-18 NOTE — LETTER
2/18/2025      Rachel Rasheed [address] [facility] [address]        No notes on file      Sincerely,        SHALONDA Kincaid CNP    Electronically signed

## 2025-02-20 LAB
ANION GAP SERPL CALCULATED.3IONS-SCNC: 19 MMOL/L (ref 7–15)
BUN SERPL-MCNC: 25.5 MG/DL (ref 8–23)
CALCIUM SERPL-MCNC: 9.3 MG/DL (ref 8.8–10.4)
CHLORIDE SERPL-SCNC: 100 MMOL/L (ref 98–107)
CREAT SERPL-MCNC: 1.37 MG/DL (ref 0.51–0.95)
EGFRCR SERPLBLD CKD-EPI 2021: 40 ML/MIN/1.73M2
ERYTHROCYTE [DISTWIDTH] IN BLOOD BY AUTOMATED COUNT: 23 % (ref 10–15)
GLUCOSE SERPL-MCNC: 124 MG/DL (ref 70–99)
HCO3 SERPL-SCNC: 21 MMOL/L (ref 22–29)
HCT VFR BLD AUTO: 47.8 % (ref 35–47)
HGB BLD-MCNC: 15.2 G/DL (ref 11.7–15.7)
MCH RBC QN AUTO: 28.4 PG (ref 26.5–33)
MCHC RBC AUTO-ENTMCNC: 31.8 G/DL (ref 31.5–36.5)
MCV RBC AUTO: 89 FL (ref 78–100)
PLATELET # BLD AUTO: 216 10E3/UL (ref 150–450)
POTASSIUM SERPL-SCNC: 3.9 MMOL/L (ref 3.4–5.3)
RBC # BLD AUTO: 5.35 10E6/UL (ref 3.8–5.2)
SODIUM SERPL-SCNC: 140 MMOL/L (ref 135–145)
WBC # BLD AUTO: 6.3 10E3/UL (ref 4–11)

## 2025-02-20 PROCEDURE — 85027 COMPLETE CBC AUTOMATED: CPT | Mod: ORL | Performed by: NURSE PRACTITIONER

## 2025-02-20 PROCEDURE — P9604 ONE-WAY ALLOW PRORATED TRIP: HCPCS | Mod: ORL | Performed by: NURSE PRACTITIONER

## 2025-02-20 PROCEDURE — 80048 BASIC METABOLIC PNL TOTAL CA: CPT | Mod: ORL | Performed by: NURSE PRACTITIONER

## 2025-02-20 PROCEDURE — 36415 COLL VENOUS BLD VENIPUNCTURE: CPT | Mod: ORL | Performed by: NURSE PRACTITIONER

## 2025-03-04 ENCOUNTER — ASSISTED LIVING VISIT (OUTPATIENT)
Dept: GERIATRICS | Facility: CLINIC | Age: 75
End: 2025-03-04
Payer: COMMERCIAL

## 2025-03-04 VITALS
SYSTOLIC BLOOD PRESSURE: 118 MMHG | BODY MASS INDEX: 27.29 KG/M2 | TEMPERATURE: 98.3 F | HEART RATE: 93 BPM | WEIGHT: 159 LBS | RESPIRATION RATE: 18 BRPM | DIASTOLIC BLOOD PRESSURE: 87 MMHG

## 2025-03-04 NOTE — PROGRESS NOTES
[facility] GERIATRICS    Chief Complaint   Patient presents with    RECHECK     HPI:  Rachel Weiss is a 74 year old  (1950), who is being seen today for an episodic care visit at: [facility]) [[id number]]. Today's concern is: ***    Allergies, and PMH/PSH reviewed in [facility] today.  REVIEW OF SYSTEMS:  {rtvpwr77:853974}    Objective:   /87   Pulse 93   Temp 98.3  F (36.8  C)   Resp 18   Wt 72.1 kg (159 lb)   BMI 27.29 kg/m    {Nursing home physical exam :841652}    {fgslab:130204}    Assessment/Plan:  {FGS DX2:633694}    MED REC REQUIRED{TIP  Click the link below to document or use med rec list, use list to pull in response :543161}  Post Medication Reconciliation Status: {MED REC LIST:023253}      Orders:  {fgsorders:019514}  ***    Electronically signed by: Stefany Davis ***

## 2025-03-04 NOTE — LETTER
3/4/2025      Rachel Rasheed [address] [facility] [address]        No notes on file      Sincerely,        SHALONDA Kincaid CNP    Electronically signed

## 2025-04-17 ENCOUNTER — ASSISTED LIVING VISIT (OUTPATIENT)
Dept: GERIATRICS | Facility: CLINIC | Age: 75
End: 2025-04-17
Payer: COMMERCIAL

## 2025-04-17 VITALS
SYSTOLIC BLOOD PRESSURE: 136 MMHG | BODY MASS INDEX: 23.34 KG/M2 | HEART RATE: 79 BPM | RESPIRATION RATE: 18 BRPM | DIASTOLIC BLOOD PRESSURE: 60 MMHG | WEIGHT: 136 LBS | TEMPERATURE: 97.5 F

## 2025-04-17 DIAGNOSIS — F03.92 DEMENTIA WITH PSYCHOSIS (H): Primary | ICD-10-CM

## 2025-04-17 DIAGNOSIS — I48.20 CHRONIC ATRIAL FIBRILLATION (H): ICD-10-CM

## 2025-04-17 DIAGNOSIS — I89.0 LYMPHEDEMA: ICD-10-CM

## 2025-04-17 DIAGNOSIS — I10 PRIMARY HYPERTENSION: ICD-10-CM

## 2025-04-17 DIAGNOSIS — I50.20 HFREF (HEART FAILURE WITH REDUCED EJECTION FRACTION) (H): ICD-10-CM

## 2025-04-17 DIAGNOSIS — R44.3 HALLUCINATIONS: ICD-10-CM

## 2025-04-17 DIAGNOSIS — I50.20 HFREF (HEART FAILURE WITH REDUCED EJECTION FRACTION) (H): Primary | ICD-10-CM

## 2025-04-17 DIAGNOSIS — N18.31 TYPE 2 DIABETES MELLITUS WITH STAGE 3A CHRONIC KIDNEY DISEASE, WITHOUT LONG-TERM CURRENT USE OF INSULIN (H): ICD-10-CM

## 2025-04-17 DIAGNOSIS — E11.22 TYPE 2 DIABETES MELLITUS WITH STAGE 3A CHRONIC KIDNEY DISEASE, WITHOUT LONG-TERM CURRENT USE OF INSULIN (H): ICD-10-CM

## 2025-04-17 PROCEDURE — 99349 HOME/RES VST EST MOD MDM 40: CPT | Performed by: NURSE PRACTITIONER

## 2025-04-17 RX ORDER — TORSEMIDE 10 MG/1
30 TABLET ORAL DAILY
Qty: 90 TABLET | Refills: 11 | Status: SHIPPED | OUTPATIENT
Start: 2025-04-17

## 2025-04-17 NOTE — LETTER
4/17/2025      Rachel Rasheed [address] [address]        No notes on file      Sincerely,        SHALONDA Cooper CNP    Electronically signed

## 2025-04-17 NOTE — PROGRESS NOTES
[facility] GERIATRICS    Chief Complaint   Patient presents with    RECHECK     HPI:  Rachel Weiss is a 74 year old  (1950), who is being seen today for an episodic care visit at: [facility]) [[id number]]. Today's concern is: ***    Allergies, and PMH/PSH reviewed in [facility] today.  REVIEW OF SYSTEMS:  {ajcqlm08:184197}    Objective:   /60   Pulse 79   Temp 97.5  F (36.4  C)   Resp 18   Wt 71.2 kg (157 lb)   BMI 26.95 kg/m    {Nursing home physical exam :299487}    {fgslab:176383}    Assessment/Plan:  {S DX2:474620}    MED REC REQUIRED{TIP  Click the link below to document or use med rec list, use list to pull in response :028424}  Post Medication Reconciliation Status: {MED REC LIST:616037}      Orders:  {fgsorders:833792}  ***    Electronically signed by: Stefany Davis ***       "possible cellulitis of her LLE and UTI. UC 1/31/2024 with <10,000 mixed organisms. She tested positive for COVID-19 and was not treated. Losartan, torsemide and spironolactone were decreased. Jardiance discontinued due to UTI. Psychiatry consulted and ordered zyprexa prn delirium. She returned to the facility 2/6/2024.   She was sent to the [facility] ED 2/7/2024 with unresponsive episode and BP 96/50. She was alert and interactive in the ED. Labs and VS were within range and she returned to the facility.   Received cefdinir and doxycycline for RLL pneumonia 2/2025.       Today's concern is:      Dementia with psychosis (H)  Hallucinations  HFrEF (heart failure with reduced ejection fraction) (H)  Lymphedema  Chronic atrial fibrillation (H)  Type 2 diabetes mellitus with stage 3a chronic kidney disease, without long-term current use of insulin (H)  Primary hypertension  She is seen for follow up of multiple medical issues. Pleasant and smiling, minimally verbal today. \"I'm ok.\" Denies pain. She looks tired and thin. Wheelchair bound and requires max assist with cares. Staff reports no behavior issues.   Spoke with her  Madhu, who states \"she has no spark.\" She sometimes doesn't recognize him, which is new. He comes every evening to assist with dinner and reports she's \"not interested in food\", which is also a change. Madhu hasn't noticed signs of pain or illness.     Allergies, and PMH/PSH reviewed in EPIC today    REVIEW OF SYSTEMS:  Unable to obtain due to dementia. Positives as noted under HPI.       Objective:   /60   Pulse 79   Temp 97.5  F (36.4  C)   Resp 18   Wt 63.5 kg (140 lb)   BMI 24.03 kg/m    GENERAL APPEARANCE:  Alert, in no distress, appears pale, thin   ENT:  [place]   EYES:  conjunctiva and lids normal  RESP:  lungs clear to auscultation   CV:  irregular rate and rhythm, no murmur, 1+ bilateral LE edema   ABDOMEN:  soft, non-tender, no distension  M/S:  wheelchair. DICKERSON with appropriate " strength. No joint inflammation   SKIN:  no visible rashes or open areas   PSYCH:  oriented to self, insight and judgement impaired, memory impaired, affect normal         ASSESSMENT / PLAN:  (F03.92) Dementia with psychosis (H)  (primary encounter diagnosis)  (R44.3) Hallucinations  Comment: advancing disease. She looks more frail and weight is down almost 20 lbs in the past 2 months.   Discussed goals of care with her , including hospice. Decided to check labs first, minimize meds and hold off on hospice consult for now.   Plan: CBC, CMP. Continue seroquel,  sertraline.  DISCONTINUE vitamin D, rosuvastatin. Memory Care staff assistance with cares, meals, activities, med admin  Discussed with staff     (I50.20) HFrEF (heart failure with reduced ejection fraction) (H)  (I89.0) Lymphedema  Comment: she appears dry. Weight loss as above   Plan: decrease lasix to 30 mg daily. Continue spironolactone 12.5 mg daily, metoprolol ER 25 mg daily, digoxin 125 mcg daily. Labs as above.     (I48.20) Chronic atrial fibrillation (H)  Comment: rate controlled: 73-79  Plan: continue apixaban, digoxin, metoprolol. Check digoxin level     (E11.22,  N18.31) Type 2 diabetes mellitus with stage 3a chronic kidney disease, without long-term current use of insulin (H)  Comment: oral intake has declined. No diarrhea of GI symptoms noted by staff   Plan: continue metformin. HgbA1c     (I10) Primary hypertension  Comment: recent BPs: 136/60, 104/66  Plan: continue torsemide, spironolactone, metoprolol. Labs as above             Electronically signed by: SHALONDA Cooper CNP

## 2025-04-21 ENCOUNTER — LAB REQUISITION (OUTPATIENT)
Dept: LAB | Facility: CLINIC | Age: 75
End: 2025-04-21
Payer: COMMERCIAL

## 2025-04-21 DIAGNOSIS — I50.40 UNSPECIFIED COMBINED SYSTOLIC (CONGESTIVE) AND DIASTOLIC (CONGESTIVE) HEART FAILURE (H): ICD-10-CM

## 2025-04-21 DIAGNOSIS — E11.8 TYPE 2 DIABETES MELLITUS WITH UNSPECIFIED COMPLICATIONS (H): ICD-10-CM

## 2025-04-24 LAB
ALBUMIN SERPL BCG-MCNC: 3.4 G/DL (ref 3.5–5.2)
ALP SERPL-CCNC: 183 U/L (ref 40–150)
ALT SERPL W P-5'-P-CCNC: 9 U/L (ref 0–50)
ANION GAP SERPL CALCULATED.3IONS-SCNC: 17 MMOL/L (ref 7–15)
AST SERPL W P-5'-P-CCNC: 37 U/L (ref 0–45)
BILIRUB SERPL-MCNC: 2.6 MG/DL
BUN SERPL-MCNC: 32.6 MG/DL (ref 8–23)
CALCIUM SERPL-MCNC: 9.3 MG/DL (ref 8.8–10.4)
CHLORIDE SERPL-SCNC: 98 MMOL/L (ref 98–107)
CREAT SERPL-MCNC: 1.39 MG/DL (ref 0.51–0.95)
DIGOXIN SERPL-MCNC: 0.5 NG/ML (ref 0.6–1.2)
EGFRCR SERPLBLD CKD-EPI 2021: 39 ML/MIN/1.73M2
ERYTHROCYTE [DISTWIDTH] IN BLOOD BY AUTOMATED COUNT: 22 % (ref 10–15)
EST. AVERAGE GLUCOSE BLD GHB EST-MCNC: 126 MG/DL
GLUCOSE SERPL-MCNC: 83 MG/DL (ref 70–99)
HBA1C MFR BLD: 6 %
HCO3 SERPL-SCNC: 22 MMOL/L (ref 22–29)
HCT VFR BLD AUTO: 47.6 % (ref 35–47)
HGB BLD-MCNC: 15.3 G/DL (ref 11.7–15.7)
MCH RBC QN AUTO: 29.3 PG (ref 26.5–33)
MCHC RBC AUTO-ENTMCNC: 32.1 G/DL (ref 31.5–36.5)
MCV RBC AUTO: 91 FL (ref 78–100)
PLATELET # BLD AUTO: 172 10E3/UL (ref 150–450)
POTASSIUM SERPL-SCNC: 4.7 MMOL/L (ref 3.4–5.3)
PROT SERPL-MCNC: 7 G/DL (ref 6.4–8.3)
RBC # BLD AUTO: 5.23 10E6/UL (ref 3.8–5.2)
SODIUM SERPL-SCNC: 137 MMOL/L (ref 135–145)
WBC # BLD AUTO: 6 10E3/UL (ref 4–11)

## 2025-04-24 PROCEDURE — 85027 COMPLETE CBC AUTOMATED: CPT | Mod: ORL | Performed by: NURSE PRACTITIONER

## 2025-04-24 PROCEDURE — 83036 HEMOGLOBIN GLYCOSYLATED A1C: CPT | Mod: ORL | Performed by: NURSE PRACTITIONER

## 2025-04-24 PROCEDURE — 36415 COLL VENOUS BLD VENIPUNCTURE: CPT | Mod: ORL | Performed by: NURSE PRACTITIONER

## 2025-04-24 PROCEDURE — 80053 COMPREHEN METABOLIC PANEL: CPT | Mod: ORL | Performed by: NURSE PRACTITIONER

## 2025-04-24 PROCEDURE — P9604 ONE-WAY ALLOW PRORATED TRIP: HCPCS | Mod: ORL | Performed by: NURSE PRACTITIONER

## 2025-04-24 PROCEDURE — 80162 ASSAY OF DIGOXIN TOTAL: CPT | Mod: ORL | Performed by: NURSE PRACTITIONER

## 2025-05-12 VITALS
HEART RATE: 79 BPM | DIASTOLIC BLOOD PRESSURE: 60 MMHG | TEMPERATURE: 97.5 F | WEIGHT: 140 LBS | SYSTOLIC BLOOD PRESSURE: 136 MMHG | RESPIRATION RATE: 18 BRPM | BODY MASS INDEX: 24.03 KG/M2